# Patient Record
Sex: FEMALE | Race: WHITE | Employment: FULL TIME | URBAN - METROPOLITAN AREA
[De-identification: names, ages, dates, MRNs, and addresses within clinical notes are randomized per-mention and may not be internally consistent; named-entity substitution may affect disease eponyms.]

---

## 2016-12-23 LAB
ANTIBODY SCREEN OB: NEGATIVE
HEPATITIS B SURFACE ANTIGEN OB: NEGATIVE
HIV RESULT OB: NEGATIVE
RAPID PLASMA REAGIN OB: NONREACTIVE
RH FACTOR OB: POSITIVE

## 2017-06-09 LAB — AMB EXT STREP B CULTURE: NEGATIVE

## 2017-07-27 ENCOUNTER — HOSPITAL ENCOUNTER (INPATIENT)
Dept: OBGYN CLINIC | Facility: HOSPITAL | Age: 32
Discharge: HOME | End: 2017-07-27
Payer: COMMERCIAL

## 2017-07-27 ENCOUNTER — HOSPITAL ENCOUNTER (INPATIENT)
Facility: HOSPITAL | Age: 32
LOS: 2 days | Discharge: HOME | End: 2017-07-29
Attending: OBSTETRICS & GYNECOLOGY | Admitting: OBSTETRICS & GYNECOLOGY
Payer: COMMERCIAL

## 2017-07-27 PROBLEM — Z34.90 PREGNANCY (HCC): Status: ACTIVE | Noted: 2017-07-27

## 2017-07-27 PROBLEM — Z34.90 PREGNANCY: Status: ACTIVE | Noted: 2017-07-27

## 2017-07-27 LAB
ANTIBODY SCREEN: NEGATIVE
BILIRUBIN URINE: NEGATIVE
BLOOD URINE: NEGATIVE
CONTROL RUN WITHIN 24 HOURS?: YES
ERYTHROCYTE [DISTWIDTH] IN BLOOD BY AUTOMATED COUNT: 14.9 % (ref 11.5–16)
GLUCOSE URINE: NEGATIVE
HCT VFR BLD AUTO: 31.8 % (ref 34–50)
HGB BLD-MCNC: 10.7 G/DL (ref 12–16)
KETONE URINE: NEGATIVE
MCH RBC QN AUTO: 28.8 PG (ref 27–33.2)
MCHC RBC AUTO-ENTMCNC: 33.6 G/DL (ref 31–37)
MCV RBC AUTO: 85.7 FL (ref 81–100)
NITRITE URINE: NEGATIVE
PH URINE: 7 (ref 5–8)
PLATELET # BLD AUTO: 292 10(3)UL (ref 150–450)
PROTEIN URINE: NEGATIVE
RBC # BLD AUTO: 3.71 X10(6)UL (ref 3.8–5.1)
RED CELL DISTRIBUTION WIDTH-SD: 46.8 FL (ref 35.1–46.3)
RH BLOOD TYPE: POSITIVE
SPEC GRAVITY: 1.02 (ref 1–1.03)
T PALLIDUM AB SER QL IA: NONREACTIVE
URINE CLARITY: CLEAR
URINE COLOR: YELLOW
UROBILINOGEN URINE: 0.2
WBC # BLD AUTO: 8.1 X10(3) UL (ref 4–13)

## 2017-07-27 PROCEDURE — 86780 TREPONEMA PALLIDUM: CPT | Performed by: OBSTETRICS & GYNECOLOGY

## 2017-07-27 PROCEDURE — 10907ZC DRAINAGE OF AMNIOTIC FLUID, THERAPEUTIC FROM PRODUCTS OF CONCEPTION, VIA NATURAL OR ARTIFICIAL OPENING: ICD-10-PCS | Performed by: OBSTETRICS & GYNECOLOGY

## 2017-07-27 PROCEDURE — 86901 BLOOD TYPING SEROLOGIC RH(D): CPT | Performed by: OBSTETRICS & GYNECOLOGY

## 2017-07-27 PROCEDURE — 86850 RBC ANTIBODY SCREEN: CPT | Performed by: OBSTETRICS & GYNECOLOGY

## 2017-07-27 PROCEDURE — 81002 URINALYSIS NONAUTO W/O SCOPE: CPT

## 2017-07-27 PROCEDURE — 86900 BLOOD TYPING SEROLOGIC ABO: CPT | Performed by: OBSTETRICS & GYNECOLOGY

## 2017-07-27 PROCEDURE — 85027 COMPLETE CBC AUTOMATED: CPT | Performed by: OBSTETRICS & GYNECOLOGY

## 2017-07-27 RX ORDER — NALBUPHINE HCL 10 MG/ML
2.5 AMPUL (ML) INJECTION
Status: DISCONTINUED | OUTPATIENT
Start: 2017-07-27 | End: 2017-07-29

## 2017-07-27 RX ORDER — IBUPROFEN 600 MG/1
600 TABLET ORAL ONCE AS NEEDED
Status: DISCONTINUED | OUTPATIENT
Start: 2017-07-27 | End: 2017-07-28

## 2017-07-27 RX ORDER — DEXTROSE, SODIUM CHLORIDE, SODIUM LACTATE, POTASSIUM CHLORIDE, AND CALCIUM CHLORIDE 5; .6; .31; .03; .02 G/100ML; G/100ML; G/100ML; G/100ML; G/100ML
INJECTION, SOLUTION INTRAVENOUS AS NEEDED
Status: DISCONTINUED | OUTPATIENT
Start: 2017-07-27 | End: 2017-07-28

## 2017-07-27 RX ORDER — SODIUM CHLORIDE, SODIUM LACTATE, POTASSIUM CHLORIDE, CALCIUM CHLORIDE 600; 310; 30; 20 MG/100ML; MG/100ML; MG/100ML; MG/100ML
INJECTION, SOLUTION INTRAVENOUS CONTINUOUS
Status: DISCONTINUED | OUTPATIENT
Start: 2017-07-27 | End: 2017-07-28

## 2017-07-27 RX ORDER — TERBUTALINE SULFATE 1 MG/ML
0.25 INJECTION, SOLUTION SUBCUTANEOUS AS NEEDED
Status: DISCONTINUED | OUTPATIENT
Start: 2017-07-27 | End: 2017-07-28

## 2017-07-27 RX ORDER — CHOLECALCIFEROL (VITAMIN D3) 25 MCG
1 TABLET,CHEWABLE ORAL DAILY
COMMUNITY
End: 2018-02-26 | Stop reason: ALTCHOICE

## 2017-07-27 RX ORDER — EPHEDRINE SULFATE 50 MG/ML
5 INJECTION, SOLUTION INTRAVENOUS AS NEEDED
Status: DISCONTINUED | OUTPATIENT
Start: 2017-07-27 | End: 2017-07-28

## 2017-07-27 NOTE — PROGRESS NOTES
Pt is a 34year old female admitted to -A. Patient presents with:  Scheduled Induction: post dates     Pt is  40w5d intra-uterine pregnancy. History obtained, consents signed. Oriented to room, staff, and plan of care.

## 2017-07-27 NOTE — PROGRESS NOTES
Contracts q 3 -4 minutes all day long  Options discussed  VE: 2 - 3 / 60 / -1 posterior, medium - decided to continue so AROM - clear    (+) scalp stimulation with exam - tracing category 1    Continue post dates induction.

## 2017-07-27 NOTE — H&P
[facility]  History & Physical    Traci Up Patient Status:  Inpatient    1985 MRN [id number]   Location [address] [facility] Attending Soraida Dyson MD   Hosp Day # 0 PCP Nicole Blizzard, MD     Subjective:  Magalys Rice alternatives and possible complications have been discussed in detail with the patient. Pre-admission, admission, and post admission procedures and expectations were discussed in detail.   All questions answered, all appropriate consents will be signed at

## 2017-07-28 PROCEDURE — 0HQ9XZZ REPAIR PERINEUM SKIN, EXTERNAL APPROACH: ICD-10-PCS | Performed by: OBSTETRICS & GYNECOLOGY

## 2017-07-28 PROCEDURE — 3E033VJ INTRODUCTION OF OTHER HORMONE INTO PERIPHERAL VEIN, PERCUTANEOUS APPROACH: ICD-10-PCS | Performed by: OBSTETRICS & GYNECOLOGY

## 2017-07-28 RX ORDER — BISACODYL 10 MG
10 SUPPOSITORY, RECTAL RECTAL ONCE AS NEEDED
Status: ACTIVE | OUTPATIENT
Start: 2017-07-28 | End: 2017-07-28

## 2017-07-28 RX ORDER — HYDROCODONE BITARTRATE AND ACETAMINOPHEN 5; 325 MG/1; MG/1
1 TABLET ORAL EVERY 4 HOURS PRN
Status: DISCONTINUED | OUTPATIENT
Start: 2017-07-28 | End: 2017-07-29

## 2017-07-28 RX ORDER — ACETAMINOPHEN 325 MG/1
650 TABLET ORAL EVERY 4 HOURS PRN
Status: DISCONTINUED | OUTPATIENT
Start: 2017-07-28 | End: 2017-07-29

## 2017-07-28 RX ORDER — ZOLPIDEM TARTRATE 5 MG/1
5 TABLET ORAL NIGHTLY PRN
Status: DISCONTINUED | OUTPATIENT
Start: 2017-07-28 | End: 2017-07-29

## 2017-07-28 RX ORDER — HYDROCODONE BITARTRATE AND ACETAMINOPHEN 5; 325 MG/1; MG/1
2 TABLET ORAL EVERY 4 HOURS PRN
Status: DISCONTINUED | OUTPATIENT
Start: 2017-07-28 | End: 2017-07-29

## 2017-07-28 RX ORDER — FAMOTIDINE 20 MG/1
20 TABLET ORAL 2 TIMES DAILY
Status: DISCONTINUED | OUTPATIENT
Start: 2017-07-28 | End: 2017-07-29

## 2017-07-28 RX ORDER — METHYLERGONOVINE MALEATE 0.2 MG/1
0.2 TABLET ORAL 3 TIMES DAILY
Status: COMPLETED | OUTPATIENT
Start: 2017-07-28 | End: 2017-07-28

## 2017-07-28 RX ORDER — IBUPROFEN 600 MG/1
600 TABLET ORAL EVERY 6 HOURS
Status: DISCONTINUED | OUTPATIENT
Start: 2017-07-28 | End: 2017-07-29

## 2017-07-28 RX ORDER — DOCUSATE SODIUM 100 MG/1
100 CAPSULE, LIQUID FILLED ORAL
Status: DISCONTINUED | OUTPATIENT
Start: 2017-07-28 | End: 2017-07-29

## 2017-07-28 RX ORDER — SIMETHICONE 80 MG
80 TABLET,CHEWABLE ORAL 3 TIMES DAILY PRN
Status: DISCONTINUED | OUTPATIENT
Start: 2017-07-28 | End: 2017-07-29

## 2017-07-28 RX ORDER — SODIUM CHLORIDE, SODIUM LACTATE, POTASSIUM CHLORIDE, CALCIUM CHLORIDE 600; 310; 30; 20 MG/100ML; MG/100ML; MG/100ML; MG/100ML
INJECTION, SOLUTION INTRAVENOUS ONCE
Status: COMPLETED | OUTPATIENT
Start: 2017-07-28 | End: 2017-07-28

## 2017-07-28 NOTE — L&D DELIVERY NOTE
Ryan Gill, Girl  [[id number]]     Labor Events     labor?:  No    steroids?:  None   Rupture date:  17   Rupture time:  1715   Rupture type:  AROM   Fluid color:  Clear   Induction:  Oxytocin   Indications for induction:  Post-term Gestati Note          Christiane Singh Patient Status:  Inpatient    1985 MRN [id number]   Location [unfilled] Attending Mariah Rosa MD   Hosp Day # 1 PCP Eddie Taylor MD       Pre Op Dx:  IUP at 41 weeks         Post Dates Induction    Post Op

## 2017-07-29 VITALS
RESPIRATION RATE: 18 BRPM | SYSTOLIC BLOOD PRESSURE: 120 MMHG | HEIGHT: 67 IN | BODY MASS INDEX: 39.39 KG/M2 | TEMPERATURE: 98 F | DIASTOLIC BLOOD PRESSURE: 83 MMHG | HEART RATE: 75 BPM | WEIGHT: 251 LBS | OXYGEN SATURATION: 99 %

## 2017-07-29 PROBLEM — Z34.90 PREGNANCY (HCC): Status: RESOLVED | Noted: 2017-07-27 | Resolved: 2017-07-29

## 2017-07-29 PROBLEM — Z34.90 PREGNANCY: Status: RESOLVED | Noted: 2017-07-27 | Resolved: 2017-07-29

## 2017-07-29 LAB
BASOPHILS # BLD AUTO: 0.02 X10(3) UL (ref 0–0.1)
BASOPHILS NFR BLD AUTO: 0.2 %
EOSINOPHIL # BLD AUTO: 0.13 X10(3) UL (ref 0–0.3)
EOSINOPHIL NFR BLD AUTO: 1.4 %
ERYTHROCYTE [DISTWIDTH] IN BLOOD BY AUTOMATED COUNT: 15.6 % (ref 11.5–16)
HCT VFR BLD AUTO: 22.6 % (ref 34–50)
HGB BLD-MCNC: 7.2 G/DL (ref 12–16)
IMMATURE GRANULOCYTE COUNT: 0.06 X10(3) UL (ref 0–1)
IMMATURE GRANULOCYTE RATIO %: 0.7 %
LYMPHOCYTES # BLD AUTO: 1.89 X10(3) UL (ref 0.9–4)
LYMPHOCYTES NFR BLD AUTO: 20.8 %
MCH RBC QN AUTO: 28.7 PG (ref 27–33.2)
MCHC RBC AUTO-ENTMCNC: 31.9 G/DL (ref 31–37)
MCV RBC AUTO: 90 FL (ref 81–100)
MONOCYTES # BLD AUTO: 0.69 X10(3) UL (ref 0.1–0.6)
MONOCYTES NFR BLD AUTO: 7.6 %
NEUTROPHIL ABS PRELIM: 6.29 X10 (3) UL (ref 1.3–6.7)
NEUTROPHILS # BLD AUTO: 6.29 X10(3) UL (ref 1.3–6.7)
NEUTROPHILS NFR BLD AUTO: 69.3 %
PLATELET # BLD AUTO: 185 10(3)UL (ref 150–450)
RBC # BLD AUTO: 2.51 X10(6)UL (ref 3.8–5.1)
RED CELL DISTRIBUTION WIDTH-SD: 51.1 FL (ref 35.1–46.3)
WBC # BLD AUTO: 9.1 X10(3) UL (ref 4–13)

## 2017-07-29 PROCEDURE — 85025 COMPLETE CBC W/AUTO DIFF WBC: CPT | Performed by: OBSTETRICS & GYNECOLOGY

## 2017-07-29 NOTE — PROGRESS NOTES
[facility]  Post-Partum Progress Note    Li Prows Patient Status:  Inpatient    1985 MRN [id number]   [facility] 2SW-J Attending Pranay Hull MD   Hosp Day # 2 PCP Daniele Christiansen MD     SUBJECTIVE:    Postpartum

## 2017-07-29 NOTE — DISCHARGE SUMMARY
[facility]  Discharge Summary    Liana Mendez Patient Status:  Inpatient    1985 MRN [id number]   [facility] 2SW-J Attending Thomas Patel MD   Hosp Day # 2 PCP Amy Silveira MD         [facility]: Estimated Date of Deliver

## 2017-07-29 NOTE — PLAN OF CARE
POSTPARTUM    • Long Term Goal:Experiences normal postpartum course Completed    • Experiences normal breast weaning course Completed    • Appropriate maternal -  bonding Completed

## 2017-08-01 ENCOUNTER — TELEPHONE (OUTPATIENT)
Dept: OBGYN UNIT | Facility: HOSPITAL | Age: 32
End: 2017-08-01

## 2017-10-13 ENCOUNTER — OFFICE VISIT (OUTPATIENT)
Dept: FAMILY MEDICINE CLINIC | Facility: CLINIC | Age: 32
End: 2017-10-13

## 2017-10-13 VITALS
RESPIRATION RATE: 12 BRPM | TEMPERATURE: 99 F | DIASTOLIC BLOOD PRESSURE: 76 MMHG | SYSTOLIC BLOOD PRESSURE: 104 MMHG | HEART RATE: 64 BPM

## 2017-10-13 DIAGNOSIS — F41.1 ANXIETY STATE: ICD-10-CM

## 2017-10-13 DIAGNOSIS — F32.A DEPRESSION, UNSPECIFIED DEPRESSION TYPE: Primary | ICD-10-CM

## 2017-10-13 PROCEDURE — 99214 OFFICE O/P EST MOD 30 MIN: CPT | Performed by: FAMILY MEDICINE

## 2017-10-13 RX ORDER — FLUOXETINE HYDROCHLORIDE 20 MG/1
20 CAPSULE ORAL DAILY
Qty: 90 CAPSULE | Refills: 0 | Status: SHIPPED | OUTPATIENT
Start: 2017-10-13 | End: 2017-11-28

## 2017-10-13 RX ORDER — ALPRAZOLAM 0.25 MG/1
TABLET ORAL
Qty: 30 TABLET | Refills: 1 | Status: SHIPPED | OUTPATIENT
Start: 2017-10-13 | End: 2020-02-16

## 2017-10-13 NOTE — PROGRESS NOTES
Alfred Monahan is a 34year old female. CHIEF COMPLAINT   Depression and anxiety  HPI:   1 month old daughter. Not breastfeeding. Stopped prozac in April 2016. Started having problems with depression during pregnancy.   Somewhat worse after delivering depression/anxiety, discussed recommendation to stay on SSRI long term since has struggled with depression for many years.       ASSESSMENT AND PLAN:   Depression, unspecified depression type  (primary encounter diagnosis)  Anxiety state    No orders of the

## 2017-11-10 ENCOUNTER — HOSPITAL ENCOUNTER (OUTPATIENT)
Age: 32
Discharge: HOME | End: 2017-11-10
Attending: FAMILY MEDICINE
Payer: COMMERCIAL

## 2017-11-10 VITALS
BODY MASS INDEX: 33.74 KG/M2 | OXYGEN SATURATION: 98 % | HEART RATE: 61 BPM | WEIGHT: 215 LBS | TEMPERATURE: 98 F | HEIGHT: 67 IN | DIASTOLIC BLOOD PRESSURE: 77 MMHG | RESPIRATION RATE: 18 BRPM | SYSTOLIC BLOOD PRESSURE: 120 MMHG

## 2017-11-10 DIAGNOSIS — J02.9 TONSILLOPHARYNGITIS: Primary | ICD-10-CM

## 2017-11-10 DIAGNOSIS — H92.09 REFERRED OTALGIA, UNSPECIFIED LATERALITY: ICD-10-CM

## 2017-11-10 PROCEDURE — 87081 CULTURE SCREEN ONLY: CPT | Performed by: FAMILY MEDICINE

## 2017-11-10 PROCEDURE — 87430 STREP A AG IA: CPT | Performed by: FAMILY MEDICINE

## 2017-11-10 PROCEDURE — 99202 OFFICE O/P NEW SF 15 MIN: CPT

## 2017-11-10 PROCEDURE — 99214 OFFICE O/P EST MOD 30 MIN: CPT

## 2017-11-10 NOTE — ED INITIAL ASSESSMENT (HPI)
Pt presents to the immediate care due to sore throat and bilateral ear pain (L > R). Pt states she also has \"head cold\" symptoms including congestion. Pt denies known fevers.

## 2017-11-10 NOTE — ED PROVIDER NOTES
Patient Seen in: [address] [facility]    History   Patient presents with:  Sore Throat  Ear Pain    Stated Complaint: sore throat ear pain    HPI  This is a 34-year-old female coming in with complaints of sore throat and bilateral ear pain the bilateral tympanic membranes with middle ear effusion, nares normal, bilateral tympanic membranes with middle ear effusion, the left is worse than the right, no signs of ear infection, likely referred otalgia.   NECK: FROM, supple, anterior cervical lymphad gargling at least 3 times a day  · Adequate rest and hydration emphasized  · Vitamin C 8524-4667 mg per day  Worsening of symptoms including signs of complications and if so please go to the ER               Disposition and Plan     Clinical Impression:  T

## 2017-11-20 NOTE — TELEPHONE ENCOUNTER
From: Willem Jones  Sent: 11/20/2017 9:54 AM CST  Subject: Medication Renewal Request    Willem Jones would like a refill of the following medications:     FLUoxetine HCl 20 MG Oral Cap [Stephanie Dressler, PA-C]   Patient Comment: I've been taking 40mg

## 2017-11-21 NOTE — TELEPHONE ENCOUNTER
Med check scheduled for 12/21. Pt states she is out of med today and is leaving for out of town tonight. Pls send to [facility].

## 2017-11-22 RX ORDER — FLUOXETINE HYDROCHLORIDE 20 MG/1
20 CAPSULE ORAL DAILY
Qty: 90 CAPSULE | Refills: 0
Start: 2017-11-22

## 2017-11-28 RX ORDER — FLUOXETINE HYDROCHLORIDE 20 MG/1
20 CAPSULE ORAL DAILY
Qty: 90 CAPSULE | Refills: 0 | Status: SHIPPED | OUTPATIENT
Start: 2017-11-28 | End: 2017-12-28

## 2017-12-21 ENCOUNTER — TELEPHONE (OUTPATIENT)
Dept: FAMILY MEDICINE CLINIC | Facility: CLINIC | Age: 32
End: 2017-12-21

## 2017-12-21 NOTE — TELEPHONE ENCOUNTER
Patient called same day to cancel/reschedule. Did not realize son had a half day of school. Rescheduled to 12/28/17. No Show Policy reviewed. No charge letter sent via [address]

## 2017-12-28 NOTE — PROGRESS NOTES
Andrew Humphrey is a 34year old female. CHIEF COMPLAINT   Follow up on anxiety and depression  HPI:   Started taking 40mg of prozac after about 1 month of being on 20mg daily. Still not feeling as much edgard as she would like to.  Still feels like she can't FLUoxetine HCl 20 MG Oral Cap 270 capsule 1      Sig: Take 3 capsules (60 mg total) by mouth daily. Imaging & Consults:  OP REFERRAL TO [facility]    The patient indicates understanding of these issues and agrees to the plan.   The patient is asked

## 2018-02-26 PROBLEM — F50.819 BINGE EATING DISORDER: Status: ACTIVE | Noted: 2018-02-26

## 2018-02-26 PROBLEM — F50.81 BINGE EATING DISORDER: Status: ACTIVE | Noted: 2018-02-26

## 2018-02-27 ENCOUNTER — TELEPHONE (OUTPATIENT)
Dept: FAMILY MEDICINE CLINIC | Facility: CLINIC | Age: 33
End: 2018-02-27

## 2018-02-27 NOTE — TELEPHONE ENCOUNTER
Received request from [facility] for a PA to be completed for pt's Vyvanse 30 mg capsules every morning. Form completed, will fax with last office notes as soon as they are completed.   Form in the prior auth bin at T4 desk

## 2018-03-01 NOTE — PROGRESS NOTES
Binge eating disorder    Patient is here with a known diagnosis of binge eating disorder. She and her counselor have discussed the possibility of beginning Vyvanse therapy. We discussed the pros and cons of psychostimulant therapy.   Patient desires to go

## 2018-03-07 ENCOUNTER — TELEPHONE (OUTPATIENT)
Dept: FAMILY MEDICINE CLINIC | Facility: CLINIC | Age: 33
End: 2018-03-07

## 2018-03-07 NOTE — TELEPHONE ENCOUNTER
Outgoing call to Number provided by pharmacy, it is Jarett Lehman, member id provided by pharmacy given to [id number] specialist, member name and id provided, they are unable to locate patient in system.     Outgoing call to patient, information given, advised

## 2018-03-07 NOTE — TELEPHONE ENCOUNTER
Pt is calling our office concerned about the Refill Request for her Vyvance. Per pt, she took a script Dr. Velia Linton recently gave her to [place] and pt rec'd a call from our office that a PA is needed from [place], but pt is not familiar with [place].   Her I

## 2018-03-19 NOTE — TELEPHONE ENCOUNTER
Incoming fax received from [employer]. Letter of determination. Medication approved coverage 2/17/2018 through 3/19/2019. Patient notified, understanding verbalized. Case ID: [id number].

## 2018-04-19 NOTE — TELEPHONE ENCOUNTER
Patient is looking for her Lisdexamfetamine Dimesylate (VYVANSE) 40 MG Oral Cap from 4/5/18. Not in drawer at . Printed and given to [employer].

## 2018-06-14 NOTE — TELEPHONE ENCOUNTER
From: Lisy Jiménez  Sent: 6/11/2018 9:06 PM CDT  Subject: Medication Renewal Request    Lisy Jiménez would like a refill of the following medications:     Lisdexamfetamine Dimesylate (VYVANSE) 40 MG Oral Cap Johnny Calderon MD]   Patient Comment: I

## 2018-06-28 NOTE — PROGRESS NOTES
Binge eating disorder    Patient is here with a known diagnosis of binge eating disorder. She she has been seeing her counselor on a regular basis. She has been on Vyvanse 40 mg daily. She does believe it is helping.   She does believe she may be becom

## 2018-10-01 NOTE — PROGRESS NOTES
Binge eating disorder    Patient is here with a known diagnosis of binge eating disorder. She she has been seeing her counselor on a regular basis. She has been on an increased dose of Vyvanse to 50 mg. She does believe it is helping.   She has been ashley

## 2018-10-16 RX ORDER — FLUOXETINE HYDROCHLORIDE 20 MG/1
CAPSULE ORAL
Qty: 270 CAPSULE | Refills: 0 | Status: SHIPPED | OUTPATIENT
Start: 2018-10-16 | End: 2019-04-03

## 2018-10-23 ENCOUNTER — OFFICE VISIT (OUTPATIENT)
Dept: FAMILY MEDICINE CLINIC | Facility: CLINIC | Age: 33
End: 2018-10-23
Payer: COMMERCIAL

## 2018-10-23 VITALS
HEART RATE: 68 BPM | SYSTOLIC BLOOD PRESSURE: 128 MMHG | TEMPERATURE: 99 F | DIASTOLIC BLOOD PRESSURE: 74 MMHG | HEIGHT: 67.5 IN | RESPIRATION RATE: 12 BRPM | WEIGHT: 207 LBS | BODY MASS INDEX: 32.11 KG/M2

## 2018-10-23 DIAGNOSIS — Z13.89 SCREENING FOR GENITOURINARY CONDITION: ICD-10-CM

## 2018-10-23 DIAGNOSIS — Z23 NEED FOR VACCINATION: ICD-10-CM

## 2018-10-23 DIAGNOSIS — Z00.00 BLOOD TESTS FOR ROUTINE GENERAL PHYSICAL EXAMINATION: ICD-10-CM

## 2018-10-23 DIAGNOSIS — Z12.4 PAP SMEAR FOR CERVICAL CANCER SCREENING: ICD-10-CM

## 2018-10-23 DIAGNOSIS — Z00.00 ROUTINE GENERAL MEDICAL EXAMINATION AT A HEALTH CARE FACILITY: Primary | ICD-10-CM

## 2018-10-23 PROBLEM — K59.00 CONSTIPATION: Status: ACTIVE | Noted: 2018-10-23

## 2018-10-23 PROBLEM — K62.5 HEMORRHAGE OF RECTUM AND ANUS: Status: ACTIVE | Noted: 2018-10-23

## 2018-10-23 PROCEDURE — 90471 IMMUNIZATION ADMIN: CPT | Performed by: FAMILY MEDICINE

## 2018-10-23 PROCEDURE — 88175 CYTOPATH C/V AUTO FLUID REDO: CPT | Performed by: FAMILY MEDICINE

## 2018-10-23 PROCEDURE — 90686 IIV4 VACC NO PRSV 0.5 ML IM: CPT | Performed by: FAMILY MEDICINE

## 2018-10-23 PROCEDURE — 87624 HPV HI-RISK TYP POOLED RSLT: CPT | Performed by: FAMILY MEDICINE

## 2018-10-23 PROCEDURE — 99395 PREV VISIT EST AGE 18-39: CPT | Performed by: FAMILY MEDICINE

## 2018-10-23 RX ORDER — SUMATRIPTAN 100 MG/1
TABLET, FILM COATED ORAL
Qty: 9 TABLET | Refills: 5 | Status: SHIPPED | OUTPATIENT
Start: 2018-10-23 | End: 2020-01-23

## 2018-10-23 NOTE — PROGRESS NOTES
CHIEF COMPLAINT   Well woman exam  HPI:   Alfred Draper is a 28year old female who presents for a complete physical exam.    History of HPV age 20. Colpo with biopsy - no other treatment needed per patient. Migraines several years ago.   Was treated wit complaint of back pain  NEURO: migraines mid cycle and with menses.     PSYCHE: stable  HEMATOLOGIC: denies hx of anemia    EXAM:   /74   Pulse 68   Temp 98.6 °F (37 °C) (Oral)   Resp 12   Ht 67.5\"   Wt 207 lb   LMP 10/14/2018   BMI 31.94 kg/m²   Bod

## 2019-01-16 NOTE — TELEPHONE ENCOUNTER
Pt calling requesting refill of Lisdexamfetamine Dimesylate (VYVANSE) 50 MG Oral Cap. Pt will . Please advise.

## 2019-02-20 NOTE — TELEPHONE ENCOUNTER
Last med visit 10/1/19-advised follow up in 6 months. Has appt sched for 4/1/19  Med last ordered as 1/16/19  Please see med orders pended for review-thanks!

## 2019-04-03 RX ORDER — FLUOXETINE HYDROCHLORIDE 20 MG/1
CAPSULE ORAL
Qty: 270 CAPSULE | Refills: 0 | Status: CANCELLED | OUTPATIENT
Start: 2019-04-03

## 2019-04-03 RX ORDER — FLUOXETINE HYDROCHLORIDE 20 MG/1
40 CAPSULE ORAL DAILY
Qty: 60 CAPSULE | Refills: 0 | Status: SHIPPED | OUTPATIENT
Start: 2019-04-03 | End: 2019-05-03

## 2019-04-03 NOTE — TELEPHONE ENCOUNTER
record shows last med visit w dr Nohelia Charles 10/1/2018-wpt reporting taking fluoxetine (2) 20 mg capsules daily and doing well- recommended follow up in 6 months. No med visits since.   Fluoxetine last ordered 10/16/18 #270, no RF  Call to pt's cell reaches

## 2019-04-03 NOTE — TELEPHONE ENCOUNTER
Pt is going out of town and requesting short term refill of FLUOXETINE HCL 20 MG Oral Cap sent to her local [facility] in chart. Please advise.

## 2019-05-02 RX ORDER — FLUOXETINE HYDROCHLORIDE 20 MG/1
CAPSULE ORAL
Qty: 60 CAPSULE | Refills: 0 | OUTPATIENT
Start: 2019-05-02

## 2019-05-03 NOTE — PROGRESS NOTES
Binge eating disorder    Patient is here with a known diagnosis of binge eating disorder. She she has been seeing her counselor on a regular basis. She has been on Vyvanse 50 mg daily. She does believe it is helping.    She does feel it wears off in th

## 2019-06-07 DIAGNOSIS — F32.A DEPRESSION, UNSPECIFIED DEPRESSION TYPE: ICD-10-CM

## 2019-06-07 RX ORDER — FLUOXETINE HYDROCHLORIDE 20 MG/1
CAPSULE ORAL
Qty: 60 CAPSULE | Refills: 2 | Status: SHIPPED | OUTPATIENT
Start: 2019-06-07 | End: 2019-06-17

## 2019-06-13 ENCOUNTER — TELEPHONE (OUTPATIENT)
Dept: FAMILY MEDICINE CLINIC | Facility: CLINIC | Age: 34
End: 2019-06-13

## 2019-06-13 RX ORDER — DEXTROAMPHETAMINE SACCHARATE, AMPHETAMINE ASPARTATE, DEXTROAMPHETAMINE SULFATE AND AMPHETAMINE SULFATE 1.25; 1.25; 1.25; 1.25 MG/1; MG/1; MG/1; MG/1
5 TABLET ORAL DAILY
Qty: 30 TABLET | Refills: 0 | Status: SHIPPED | OUTPATIENT
Start: 2019-06-13 | End: 2019-07-13

## 2019-06-13 NOTE — TELEPHONE ENCOUNTER
See note below, pt had ov 5/3/19 and is currently taking Vyvanse in am, would like to try Adderall in the evening but was not told of the dose. PLease advise.

## 2019-06-13 NOTE — TELEPHONE ENCOUNTER
Pt states at her OV last month she and Dr LOPEZ discussed changing timing of  Lisdexamfetamine Dimesylate (VYVANSE) 50 MG Oral Cap. She tried and it works best in the AM like she has been taking it.  She would like to try adding the Adderall in the evening as d

## 2019-06-13 NOTE — TELEPHONE ENCOUNTER
Can try plain Adderall 5 mg 1 tablet in the early afternoon. #30 and no refill.   Ever update on her progress in 2 weeks

## 2019-06-17 DIAGNOSIS — F32.A DEPRESSION, UNSPECIFIED DEPRESSION TYPE: ICD-10-CM

## 2019-06-17 RX ORDER — FLUOXETINE HYDROCHLORIDE 20 MG/1
CAPSULE ORAL
Qty: 180 CAPSULE | Refills: 1 | Status: SHIPPED | OUTPATIENT
Start: 2019-06-17 | End: 2020-01-02

## 2019-06-28 ENCOUNTER — OFFICE VISIT (OUTPATIENT)
Dept: FAMILY MEDICINE CLINIC | Facility: CLINIC | Age: 34
End: 2019-06-28
Payer: COMMERCIAL

## 2019-06-28 VITALS
WEIGHT: 220 LBS | BODY MASS INDEX: 34 KG/M2 | OXYGEN SATURATION: 98 % | HEART RATE: 87 BPM | TEMPERATURE: 98 F | DIASTOLIC BLOOD PRESSURE: 84 MMHG | RESPIRATION RATE: 18 BRPM | SYSTOLIC BLOOD PRESSURE: 116 MMHG

## 2019-06-28 DIAGNOSIS — J22 LOWER RESPIRATORY INFECTION (E.G., BRONCHITIS, PNEUMONIA, PNEUMONITIS, PULMONITIS): Primary | ICD-10-CM

## 2019-06-28 PROCEDURE — 99213 OFFICE O/P EST LOW 20 MIN: CPT | Performed by: NURSE PRACTITIONER

## 2019-06-28 RX ORDER — DOXYCYCLINE HYCLATE 100 MG
100 TABLET ORAL 2 TIMES DAILY
Qty: 14 TABLET | Refills: 0 | Status: SHIPPED | OUTPATIENT
Start: 2019-06-28 | End: 2019-07-05

## 2019-06-28 NOTE — PROGRESS NOTES
CHIEF COMPLAINT:   Patient presents with:  Cough: ear pain/sore throat/congestion x 2 weeks. no fever      HPI:   Rachael Meek is a 27year old female who presents for cough for  2  weeks. Cough is productive and is throughout the say.  Cough isn't interfe REVIEW OF SYSTEMS:   GENERAL: See HPI  SKIN: No rashes, or other skin lesions. EYES: Denies blurred vision or double vision. HENT: Denies ear pain or decreased hearing. See HPI  CARDIOVASCULAR: Denies chest pain or palpitations  LUNGS: Per HPI. • Doxycycline Hyclate 100 MG Oral Tab 14 tablet 0     Sig: Take 1 tablet (100 mg total) by mouth 2 (two) times daily for 7 days. Patient Instructions   Start plain Mucinex (guaifenesin). Take antibiotics as prescribed.        Bronchitis, Antibioti · Over-the-counter cough, cold, and sore-throat medicines will not shorten the length of the illness, but they may be helpful to reduce your symptoms. Don't use decongestants if you have high blood pressure. · Finish all antibiotic medicine.  Do this even

## 2019-06-28 NOTE — PATIENT INSTRUCTIONS
Start plain Mucinex (guaifenesin). Take antibiotics as prescribed. Bronchitis, Antibiotic Treatment (Adult)    Bronchitis is an infection of the air passages (bronchial tubes) in your lungs. It often occurs when you have a cold.  This illness is con if you are feeling better after only a few days. Follow-up care  Follow up with your healthcare provider, or as advised. If you had an X-ray or ECG (electrocardiogram), a specialist will review it.  You will be told of any new test results that may affect

## 2019-09-25 ENCOUNTER — APPOINTMENT (OUTPATIENT)
Dept: OTHER | Age: 34
End: 2019-09-25
Attending: FAMILY MEDICINE

## 2020-01-01 DIAGNOSIS — F32.A DEPRESSION, UNSPECIFIED DEPRESSION TYPE: ICD-10-CM

## 2020-01-02 RX ORDER — FLUOXETINE HYDROCHLORIDE 20 MG/1
CAPSULE ORAL
Qty: 180 CAPSULE | Refills: 1 | Status: SHIPPED | OUTPATIENT
Start: 2020-01-02 | End: 2020-06-22

## 2020-01-02 NOTE — TELEPHONE ENCOUNTER
FLUOXETINE HCL CAPS 20MG    Non protocol medication. Please see pended medications. Please sign if appropriate. Thank you     Her last OV was on 05/03/2019.

## 2020-01-23 ENCOUNTER — TELEPHONE (OUTPATIENT)
Dept: FAMILY MEDICINE CLINIC | Facility: CLINIC | Age: 35
End: 2020-01-23

## 2020-02-03 ENCOUNTER — PATIENT MESSAGE (OUTPATIENT)
Dept: FAMILY MEDICINE CLINIC | Facility: CLINIC | Age: 35
End: 2020-02-03

## 2020-02-03 ENCOUNTER — OFFICE VISIT (OUTPATIENT)
Dept: FAMILY MEDICINE CLINIC | Facility: CLINIC | Age: 35
End: 2020-02-03
Payer: COMMERCIAL

## 2020-02-03 ENCOUNTER — LAB ENCOUNTER (OUTPATIENT)
Dept: LAB | Age: 35
End: 2020-02-03
Attending: FAMILY MEDICINE
Payer: COMMERCIAL

## 2020-02-03 VITALS
HEART RATE: 64 BPM | SYSTOLIC BLOOD PRESSURE: 116 MMHG | TEMPERATURE: 98 F | HEIGHT: 67.5 IN | WEIGHT: 235 LBS | BODY MASS INDEX: 36.45 KG/M2 | DIASTOLIC BLOOD PRESSURE: 80 MMHG | RESPIRATION RATE: 12 BRPM

## 2020-02-03 DIAGNOSIS — Z00.00 BLOOD TESTS FOR ROUTINE GENERAL PHYSICAL EXAMINATION: ICD-10-CM

## 2020-02-03 DIAGNOSIS — Z00.00 ROUTINE GENERAL MEDICAL EXAMINATION AT A HEALTH CARE FACILITY: Primary | ICD-10-CM

## 2020-02-03 DIAGNOSIS — R73.09 ELEVATED GLUCOSE: Primary | ICD-10-CM

## 2020-02-03 DIAGNOSIS — Z11.51 SCREENING FOR HPV (HUMAN PAPILLOMAVIRUS): ICD-10-CM

## 2020-02-03 DIAGNOSIS — Z12.4 PAP SMEAR FOR CERVICAL CANCER SCREENING: ICD-10-CM

## 2020-02-03 DIAGNOSIS — Z13.89 SCREENING FOR GENITOURINARY CONDITION: ICD-10-CM

## 2020-02-03 DIAGNOSIS — Z80.0 FAMILY HISTORY OF COLON CANCER: ICD-10-CM

## 2020-02-03 DIAGNOSIS — R73.09 ELEVATED GLUCOSE: ICD-10-CM

## 2020-02-03 LAB
ALBUMIN SERPL-MCNC: 3.7 G/DL (ref 3.4–5)
ALBUMIN/GLOB SERPL: 1 {RATIO} (ref 1–2)
ALP LIVER SERPL-CCNC: 55 U/L (ref 37–98)
ALT SERPL-CCNC: 32 U/L (ref 13–56)
ANION GAP SERPL CALC-SCNC: 3 MMOL/L (ref 0–18)
AST SERPL-CCNC: 22 U/L (ref 15–37)
BASOPHILS # BLD AUTO: 0.05 X10(3) UL (ref 0–0.2)
BASOPHILS NFR BLD AUTO: 1.2 %
BILIRUB SERPL-MCNC: 0.2 MG/DL (ref 0.1–2)
BILIRUB UR QL STRIP.AUTO: NEGATIVE
BUN BLD-MCNC: 15 MG/DL (ref 7–18)
BUN/CREAT SERPL: 17.6 (ref 10–20)
CALCIUM BLD-MCNC: 8.9 MG/DL (ref 8.5–10.1)
CHLORIDE SERPL-SCNC: 105 MMOL/L (ref 98–112)
CHOLEST SMN-MCNC: 236 MG/DL (ref ?–200)
CLARITY UR REFRACT.AUTO: CLEAR
CO2 SERPL-SCNC: 27 MMOL/L (ref 21–32)
COLOR UR AUTO: YELLOW
CREAT BLD-MCNC: 0.85 MG/DL (ref 0.55–1.02)
DEPRECATED RDW RBC AUTO: 45.1 FL (ref 35.1–46.3)
EOSINOPHIL # BLD AUTO: 0.13 X10(3) UL (ref 0–0.7)
EOSINOPHIL NFR BLD AUTO: 3.1 %
ERYTHROCYTE [DISTWIDTH] IN BLOOD BY AUTOMATED COUNT: 13.2 % (ref 11–15)
EST. AVERAGE GLUCOSE BLD GHB EST-MCNC: 105 MG/DL (ref 68–126)
GLOBULIN PLAS-MCNC: 3.6 G/DL (ref 2.8–4.4)
GLUCOSE BLD-MCNC: 100 MG/DL (ref 70–99)
GLUCOSE UR STRIP.AUTO-MCNC: NEGATIVE MG/DL
HBA1C MFR BLD HPLC: 5.3 % (ref ?–5.7)
HCT VFR BLD AUTO: 40.5 % (ref 35–48)
HDLC SERPL-MCNC: 43 MG/DL (ref 40–59)
HGB BLD-MCNC: 13.1 G/DL (ref 12–16)
IMM GRANULOCYTES # BLD AUTO: 0.01 X10(3) UL (ref 0–1)
IMM GRANULOCYTES NFR BLD: 0.2 %
KETONES UR STRIP.AUTO-MCNC: NEGATIVE MG/DL
LDLC SERPL CALC-MCNC: 158 MG/DL (ref ?–100)
LEUKOCYTE ESTERASE UR QL STRIP.AUTO: NEGATIVE
LYMPHOCYTES # BLD AUTO: 1.7 X10(3) UL (ref 1–4)
LYMPHOCYTES NFR BLD AUTO: 41.1 %
M PROTEIN MFR SERPL ELPH: 7.3 G/DL (ref 6.4–8.2)
MCH RBC QN AUTO: 30 PG (ref 26–34)
MCHC RBC AUTO-ENTMCNC: 32.3 G/DL (ref 31–37)
MCV RBC AUTO: 92.7 FL (ref 80–100)
MONOCYTES # BLD AUTO: 0.43 X10(3) UL (ref 0.1–1)
MONOCYTES NFR BLD AUTO: 10.4 %
NEUTROPHILS # BLD AUTO: 1.82 X10 (3) UL (ref 1.5–7.7)
NEUTROPHILS # BLD AUTO: 1.82 X10(3) UL (ref 1.5–7.7)
NEUTROPHILS NFR BLD AUTO: 44 %
NITRITE UR QL STRIP.AUTO: NEGATIVE
NONHDLC SERPL-MCNC: 193 MG/DL (ref ?–130)
OSMOLALITY SERPL CALC.SUM OF ELEC: 281 MOSM/KG (ref 275–295)
PATIENT FASTING Y/N/NP: YES
PATIENT FASTING Y/N/NP: YES
PH UR STRIP.AUTO: 6 [PH] (ref 4.5–8)
PLATELET # BLD AUTO: 312 10(3)UL (ref 150–450)
POTASSIUM SERPL-SCNC: 4.6 MMOL/L (ref 3.5–5.1)
PROT UR STRIP.AUTO-MCNC: NEGATIVE MG/DL
RBC # BLD AUTO: 4.37 X10(6)UL (ref 3.8–5.3)
RBC UR QL AUTO: NEGATIVE
SODIUM SERPL-SCNC: 135 MMOL/L (ref 136–145)
SP GR UR STRIP.AUTO: 1.03 (ref 1–1.03)
TRIGL SERPL-MCNC: 174 MG/DL (ref 30–149)
TSI SER-ACNC: 1.85 MIU/ML (ref 0.36–3.74)
UROBILINOGEN UR STRIP.AUTO-MCNC: <2 MG/DL
VLDLC SERPL CALC-MCNC: 35 MG/DL (ref 0–30)
WBC # BLD AUTO: 4.1 X10(3) UL (ref 4–11)

## 2020-02-03 PROCEDURE — 88175 CYTOPATH C/V AUTO FLUID REDO: CPT | Performed by: FAMILY MEDICINE

## 2020-02-03 PROCEDURE — 81003 URINALYSIS AUTO W/O SCOPE: CPT

## 2020-02-03 PROCEDURE — 99395 PREV VISIT EST AGE 18-39: CPT | Performed by: FAMILY MEDICINE

## 2020-02-03 PROCEDURE — 80061 LIPID PANEL: CPT

## 2020-02-03 PROCEDURE — 80053 COMPREHEN METABOLIC PANEL: CPT

## 2020-02-03 PROCEDURE — 83036 HEMOGLOBIN GLYCOSYLATED A1C: CPT

## 2020-02-03 PROCEDURE — 87624 HPV HI-RISK TYP POOLED RSLT: CPT | Performed by: FAMILY MEDICINE

## 2020-02-03 PROCEDURE — 84443 ASSAY THYROID STIM HORMONE: CPT

## 2020-02-03 PROCEDURE — 85025 COMPLETE CBC W/AUTO DIFF WBC: CPT

## 2020-02-03 PROCEDURE — 36415 COLL VENOUS BLD VENIPUNCTURE: CPT

## 2020-02-03 NOTE — PROGRESS NOTES
CHIEF COMPLAINT   Well woman exam  HPI:   Justin Waller is a 27year old female who presents for a complete physical exam.    Paps normal for past 10 years. Prior to that, had a positive HPV.   Had colpo with biopsy but patient states no treatment after th vaginal discharge  MUSCULOSKELETAL: no complaint of back pain  NEURO: no complaint of headaches  PSYCHE: seeing counselor and Dr. Cleopatra Martin.    HEMATOLOGIC: denies hx of anemia    EXAM:   /80   Pulse 64   Temp 97.7 °F (36.5 °C) (Oral)   Resp 12   Ht 6

## 2020-02-04 DIAGNOSIS — E78.00 ELEVATED LDL CHOLESTEROL LEVEL: Primary | ICD-10-CM

## 2020-02-04 NOTE — TELEPHONE ENCOUNTER
From: Deysi Bermudez  To: Will Chang PA-C  Sent: 2/3/2020 2:28 PM CST  Subject: Test Results Question    Hi there - should I be concerned that my WBC is low (4.1)? I'm not sick right now or anything. Is there anything I can do to improve it?

## 2020-02-06 ENCOUNTER — TELEPHONE (OUTPATIENT)
Dept: FAMILY MEDICINE CLINIC | Facility: CLINIC | Age: 35
End: 2020-02-06

## 2020-02-06 NOTE — TELEPHONE ENCOUNTER
Rosie Tom,     We have to check navinet for those referrals.  Depends on whether they are on aetnas list. [facility] patient call their plan to double check.  But we do get a hard stop when that happens.     Previou

## 2020-02-07 LAB — HPV I/H RISK 1 DNA SPEC QL NAA+PROBE: NEGATIVE

## 2020-02-16 DIAGNOSIS — F50.81 BINGE EATING DISORDER: ICD-10-CM

## 2020-02-16 RX ORDER — ALPRAZOLAM 0.25 MG/1
TABLET ORAL
Qty: 30 TABLET | Refills: 1 | Status: SHIPPED | OUTPATIENT
Start: 2020-02-16 | End: 2021-07-14

## 2020-04-15 DIAGNOSIS — G43.109 MIGRAINE WITH AURA AND WITHOUT STATUS MIGRAINOSUS, NOT INTRACTABLE: ICD-10-CM

## 2020-04-15 RX ORDER — SUMATRIPTAN 100 MG/1
TABLET, FILM COATED ORAL
Qty: 9 TABLET | Refills: 5 | Status: SHIPPED | OUTPATIENT
Start: 2020-04-15

## 2020-04-15 NOTE — TELEPHONE ENCOUNTER
Received request from [employer] for refill of sumatriptan tabs, this medication was filled on Jan 23 to [facility] for #9 + 5, called to pt to check if she had used all of the medication already.   Pt stated that she would like the medication to now g

## 2020-05-06 ENCOUNTER — TELEPHONE (OUTPATIENT)
Dept: FAMILY MEDICINE CLINIC | Facility: CLINIC | Age: 35
End: 2020-05-06

## 2020-05-06 DIAGNOSIS — F50.81 BINGE EATING DISORDER: ICD-10-CM

## 2020-05-06 NOTE — TELEPHONE ENCOUNTER
Pt requested refill of   Lisdexamfetamine Dimesylate (VYVANSE) 50 MG Oral Cap (Discontinued) 30 capsule 0     To be sent to:  [address], [address] [facility] [address], [phone], [phone]. Thank you.

## 2020-05-18 ENCOUNTER — VIRTUAL PHONE E/M (OUTPATIENT)
Dept: FAMILY MEDICINE CLINIC | Facility: CLINIC | Age: 35
End: 2020-05-18
Payer: COMMERCIAL

## 2020-05-18 DIAGNOSIS — G47.00 INSOMNIA, UNSPECIFIED TYPE: Primary | ICD-10-CM

## 2020-05-18 PROCEDURE — 99443 PHONE E/M BY PHYS 21-30 MIN: CPT | Performed by: FAMILY MEDICINE

## 2020-05-18 RX ORDER — ZALEPLON 5 MG/1
CAPSULE ORAL
Qty: 30 CAPSULE | Refills: 3 | Status: SHIPPED | OUTPATIENT
Start: 2020-05-18 | End: 2020-11-05

## 2020-05-18 NOTE — PROGRESS NOTES
Virtual Telephone Check-In    Alfred Monahan verbally consents to a Virtual/Telephone Check-In visit on 05/18/20. Patient understands and accepts financial responsibility for any deductible, co-insurance and/or co-pays associated with this service.     Davi

## 2020-06-09 DIAGNOSIS — F50.81 BINGE EATING DISORDER: ICD-10-CM

## 2020-06-09 NOTE — TELEPHONE ENCOUNTER
Pt is requesting a 90 day refill on     Lisdexamfetamine Dimesylate (VYVANSE) 50 MG Oral Cap (Discontinued) 30 capsule 0 3/25/2020 2/3/2020   Sig:   Take 1 capsule (50 mg total) by mouth daily.      Route:   Oral     Reason for Discontinue:   Duplicate th

## 2020-06-21 DIAGNOSIS — F32.A DEPRESSION, UNSPECIFIED DEPRESSION TYPE: ICD-10-CM

## 2020-06-22 ENCOUNTER — PATIENT MESSAGE (OUTPATIENT)
Dept: FAMILY MEDICINE CLINIC | Facility: CLINIC | Age: 35
End: 2020-06-22

## 2020-06-22 RX ORDER — FLUOXETINE HYDROCHLORIDE 20 MG/1
CAPSULE ORAL
Qty: 180 CAPSULE | Refills: 3 | Status: SHIPPED | OUTPATIENT
Start: 2020-06-22 | End: 2020-11-23

## 2020-06-22 NOTE — TELEPHONE ENCOUNTER
Pt was approved to increase dosage of her Zaleplon from 5mg to 10mg nightly by Roberto Van on 5/21/20.   New Rx was never sent, please see pended medication for approval.

## 2020-06-22 NOTE — TELEPHONE ENCOUNTER
From: Lisy Jiménez  To: Jerman Bautista PA-C  Sent: 5/18/2020 10:58 AM CDT  Subject: Prescription Question    Hi - I am experiencing severe anxiety-related insomnia.  This is nothing new, but I have episodes every few months that span a long period of

## 2020-06-22 NOTE — TELEPHONE ENCOUNTER
From: Javon Ragsdale  To: Twila Steinberg PA-C  Sent: 6/22/2020 1:26 PM CDT  Subject: Test Results Question    Hello! I had blood work on 2/3/20 and had some elevated levels. I've lost about 15 pounds since then.  When should I come back for another [facility]

## 2020-07-07 DIAGNOSIS — F50.81 BINGE EATING DISORDER: ICD-10-CM

## 2020-08-17 ENCOUNTER — PATIENT MESSAGE (OUTPATIENT)
Dept: FAMILY MEDICINE CLINIC | Facility: CLINIC | Age: 35
End: 2020-08-17

## 2020-08-17 DIAGNOSIS — F50.81 BINGE EATING DISORDER: ICD-10-CM

## 2020-08-17 NOTE — TELEPHONE ENCOUNTER
Pt requests refill via [employer]. Last fill 7.6  Has appt next month. Please approve if appropriate. Thanks.

## 2020-09-17 ENCOUNTER — LAB ENCOUNTER (OUTPATIENT)
Dept: LAB | Age: 35
End: 2020-09-17
Attending: FAMILY MEDICINE
Payer: COMMERCIAL

## 2020-09-17 DIAGNOSIS — F50.81 BINGE EATING DISORDER: ICD-10-CM

## 2020-09-17 DIAGNOSIS — Z00.00 ROUTINE PHYSICAL EXAMINATION: Primary | ICD-10-CM

## 2020-09-17 LAB
CHOLEST SMN-MCNC: 277 MG/DL (ref ?–200)
HDLC SERPL-MCNC: 59 MG/DL (ref 40–59)
LDLC SERPL CALC-MCNC: 185 MG/DL (ref ?–100)
NONHDLC SERPL-MCNC: 218 MG/DL (ref ?–130)
PATIENT FASTING Y/N/NP: YES
TRIGL SERPL-MCNC: 163 MG/DL (ref 30–149)
VLDLC SERPL CALC-MCNC: 33 MG/DL (ref 0–30)

## 2020-09-17 PROCEDURE — 80061 LIPID PANEL: CPT

## 2020-09-17 NOTE — TELEPHONE ENCOUNTER
Pt rescheduled her appt for a follow up on her vyvanse for 10/13/2020. I gave her the information for COVID-19 testing done through [facility]. Pt verbalized  understanding. Please authorize refill.

## 2020-09-17 NOTE — TELEPHONE ENCOUNTER
Pt requesting refill of Lisdexamfetamine Dimesylate (VYVANSE) 50 MG Oral Cap  To be sent to [facility] in chart. Pt states that she tried to do the video visit on Monday but she couldn't connect.     Pt also states that her son was exposed to covid and wa

## 2020-09-18 NOTE — TELEPHONE ENCOUNTER
From: Nikia Iraheta  To: Rosangela Natarajan MD  Sent: 9/17/2020 3:23 PM CDT  Subject: Test Results Question    I have a question about LIPID PANEL resulted on 9/17/20, 11:06 AM.    Are there results from the metabolic panel?     I've lost 25lbs since the fi

## 2020-10-05 ENCOUNTER — PATIENT MESSAGE (OUTPATIENT)
Dept: FAMILY MEDICINE CLINIC | Facility: CLINIC | Age: 35
End: 2020-10-05

## 2020-10-07 NOTE — TELEPHONE ENCOUNTER
From: Dustin Null  To: Keara Downs MD  Sent: 10/5/2020 10:29 AM CDT  Subject: Test Results Question    Hi Dr. Radha Garcia - I had blood work on 2/3/20. I was to repeat the blood work in 6 months and went in on 9/17/20.  The only test done was for the

## 2020-10-07 NOTE — TELEPHONE ENCOUNTER
LM for pt to cb. It looks like cmp was supposed to be added last month per Huma's mychart msg to her, but never was placed that way? She will need to re-draw her cmp-order is in.  As for other labs-her thyroid was normal in feb so we would need to just

## 2020-10-15 ENCOUNTER — TELEPHONE (OUTPATIENT)
Dept: FAMILY MEDICINE CLINIC | Facility: CLINIC | Age: 35
End: 2020-10-15

## 2020-10-15 NOTE — TELEPHONE ENCOUNTER
Pt called stating she needs labs and EKG ordered for Assesment for eating disorder program at [facility]. Pt states she was told over the phone that she needs CMP,CBC, TSH, T-2 TOTAL, T-3,T-4, MAGNESIUM, PHOSPHEROUS, AMYLASE, EKG.   Please advise if you wa

## 2020-10-15 NOTE — TELEPHONE ENCOUNTER
Please set up a video visit with me or Loly or Dr. Ruchi Whitman (or in person visit if she would prefer). Ok to use an SDA with me on Monday.

## 2020-10-19 NOTE — PROGRESS NOTES
Kane Mendez is a 38year old female. CHIEF COMPLAINT   Needs labs for eating disorder program  HPI:   Started doing counseling a couple years ago and then started again after daughter was born 3 years ago.   Referred to binge eating disorder program dominic estes mood  25 minute visit of which greater than 50% of visit was spent in coordination of care and counseling on discussing eating disorder, discussing mental health      ASSESSMENT AND PLAN:     Binge eating disorder  (primary encounter diagnosis)  Hyperchole

## 2020-10-20 ENCOUNTER — LAB ENCOUNTER (OUTPATIENT)
Dept: LAB | Age: 35
End: 2020-10-20
Attending: FAMILY MEDICINE
Payer: COMMERCIAL

## 2020-10-20 ENCOUNTER — NURSE ONLY (OUTPATIENT)
Dept: LAB | Age: 35
End: 2020-10-20
Attending: FAMILY MEDICINE
Payer: COMMERCIAL

## 2020-10-20 DIAGNOSIS — E78.00 HYPERCHOLESTEREMIA: ICD-10-CM

## 2020-10-20 DIAGNOSIS — F50.81 BINGE EATING DISORDER: ICD-10-CM

## 2020-10-20 PROCEDURE — 84436 ASSAY OF TOTAL THYROXINE: CPT

## 2020-10-20 PROCEDURE — 80061 LIPID PANEL: CPT

## 2020-10-20 PROCEDURE — 84100 ASSAY OF PHOSPHORUS: CPT

## 2020-10-20 PROCEDURE — 93005 ELECTROCARDIOGRAM TRACING: CPT

## 2020-10-20 PROCEDURE — 36415 COLL VENOUS BLD VENIPUNCTURE: CPT

## 2020-10-20 PROCEDURE — 84443 ASSAY THYROID STIM HORMONE: CPT

## 2020-10-20 PROCEDURE — 83735 ASSAY OF MAGNESIUM: CPT

## 2020-10-20 PROCEDURE — 93010 ELECTROCARDIOGRAM REPORT: CPT | Performed by: INTERNAL MEDICINE

## 2020-10-20 PROCEDURE — 84480 ASSAY TRIIODOTHYRONINE (T3): CPT

## 2020-10-20 PROCEDURE — 85025 COMPLETE CBC W/AUTO DIFF WBC: CPT

## 2020-10-20 PROCEDURE — 82150 ASSAY OF AMYLASE: CPT

## 2020-10-20 PROCEDURE — 80053 COMPREHEN METABOLIC PANEL: CPT

## 2020-10-20 PROCEDURE — 84439 ASSAY OF FREE THYROXINE: CPT

## 2021-01-15 DIAGNOSIS — F41.1 ANXIETY STATE: ICD-10-CM

## 2021-01-18 RX ORDER — ZALEPLON 10 MG/1
10 CAPSULE ORAL NIGHTLY
Qty: 90 CAPSULE | Refills: 0 | Status: SHIPPED | OUTPATIENT
Start: 2021-01-18 | End: 2021-05-10

## 2021-01-25 ENCOUNTER — TELEMEDICINE (OUTPATIENT)
Dept: FAMILY MEDICINE CLINIC | Facility: CLINIC | Age: 36
End: 2021-01-25
Payer: COMMERCIAL

## 2021-01-25 DIAGNOSIS — G43.109 MIGRAINE WITH AURA AND WITHOUT STATUS MIGRAINOSUS, NOT INTRACTABLE: ICD-10-CM

## 2021-01-25 DIAGNOSIS — E66.09 CLASS 1 OBESITY DUE TO EXCESS CALORIES WITHOUT SERIOUS COMORBIDITY WITH BODY MASS INDEX (BMI) OF 33.0 TO 33.9 IN ADULT: ICD-10-CM

## 2021-01-25 DIAGNOSIS — F32.A DEPRESSION, UNSPECIFIED DEPRESSION TYPE: Primary | ICD-10-CM

## 2021-01-25 DIAGNOSIS — F50.81 BINGE EATING DISORDER: ICD-10-CM

## 2021-01-25 PROCEDURE — 99214 OFFICE O/P EST MOD 30 MIN: CPT | Performed by: FAMILY MEDICINE

## 2021-01-25 RX ORDER — FLUOXETINE HYDROCHLORIDE 20 MG/1
60 CAPSULE ORAL DAILY
Qty: 270 CAPSULE | Refills: 1 | Status: SHIPPED | OUTPATIENT
Start: 2021-01-25 | End: 2021-07-14

## 2021-01-25 RX ORDER — BUPROPION HYDROCHLORIDE 300 MG/1
300 TABLET ORAL EVERY MORNING
Qty: 90 TABLET | Refills: 1 | Status: SHIPPED | OUTPATIENT
Start: 2021-01-25 | End: 2021-07-12

## 2021-01-25 NOTE — PROGRESS NOTES
Subjective     HPI:   Hien Kirk verbally consents to a Virtual/Telephone Check-In service on 01/25/21. Patient understands and accepts financial responsibility for any deductible, co-insurance and/or co-pays associated with this service.  This visit is

## 2021-04-09 DIAGNOSIS — Z23 NEED FOR VACCINATION: ICD-10-CM

## 2021-05-10 DIAGNOSIS — F41.1 ANXIETY STATE: ICD-10-CM

## 2021-05-10 RX ORDER — ZALEPLON 10 MG/1
10 CAPSULE ORAL NIGHTLY
Qty: 90 CAPSULE | Refills: 0 | Status: SHIPPED | OUTPATIENT
Start: 2021-05-10 | End: 2021-08-25

## 2021-07-05 DIAGNOSIS — F32.A DEPRESSION, UNSPECIFIED DEPRESSION TYPE: ICD-10-CM

## 2021-07-12 NOTE — TELEPHONE ENCOUNTER
Pt needs appt before any refills. Please have them call to schedule. Thanks   she has not read [employer] reminder, please call to schedule thanks.

## 2021-07-13 ENCOUNTER — TELEMEDICINE (OUTPATIENT)
Dept: FAMILY MEDICINE CLINIC | Facility: CLINIC | Age: 36
End: 2021-07-13
Payer: COMMERCIAL

## 2021-07-13 DIAGNOSIS — F50.81 BINGE EATING DISORDER: ICD-10-CM

## 2021-07-13 DIAGNOSIS — F32.A DEPRESSION, UNSPECIFIED DEPRESSION TYPE: ICD-10-CM

## 2021-07-13 DIAGNOSIS — F32.A DEPRESSION, UNSPECIFIED DEPRESSION TYPE: Primary | ICD-10-CM

## 2021-07-13 DIAGNOSIS — F41.1 ANXIETY STATE: ICD-10-CM

## 2021-07-13 DIAGNOSIS — G47.00 INSOMNIA, UNSPECIFIED TYPE: ICD-10-CM

## 2021-07-13 DIAGNOSIS — G43.109 MIGRAINE WITH AURA AND WITHOUT STATUS MIGRAINOSUS, NOT INTRACTABLE: ICD-10-CM

## 2021-07-13 PROCEDURE — 99213 OFFICE O/P EST LOW 20 MIN: CPT | Performed by: FAMILY MEDICINE

## 2021-07-13 RX ORDER — BUPROPION HYDROCHLORIDE 300 MG/1
TABLET ORAL
Qty: 90 TABLET | Refills: 1 | Status: SHIPPED | OUTPATIENT
Start: 2021-07-13 | End: 2021-12-22

## 2021-07-13 NOTE — PROGRESS NOTES
Subjective     HPI:   Andrew Humphrey verbally consents to a Virtual/Telephone Check-In service on 07/13/21. Patient understands and accepts financial responsibility for any deductible, co-insurance and/or co-pays associated with this service.  This visit is

## 2021-07-14 RX ORDER — FLUOXETINE HYDROCHLORIDE 20 MG/1
CAPSULE ORAL
Qty: 270 CAPSULE | Refills: 1 | Status: SHIPPED | OUTPATIENT
Start: 2021-07-14 | End: 2022-01-10

## 2021-07-14 RX ORDER — ALPRAZOLAM 0.25 MG/1
TABLET ORAL
Qty: 30 TABLET | Refills: 0 | Status: SHIPPED | OUTPATIENT
Start: 2021-07-14

## 2021-08-25 DIAGNOSIS — F41.1 ANXIETY STATE: ICD-10-CM

## 2021-08-25 RX ORDER — ZALEPLON 10 MG/1
CAPSULE ORAL
Qty: 90 CAPSULE | Refills: 0 | Status: SHIPPED | OUTPATIENT
Start: 2021-08-25 | End: 2021-11-23

## 2021-08-25 NOTE — TELEPHONE ENCOUNTER
LOV 7/13/21  No pending appt. LF 5/10/21 for #90  Follow up due in January  Please send if appropriate.

## 2021-10-05 NOTE — PROGRESS NOTES
Binge eating disorder    Patient is here with a known diagnosis of depression, anxiety and a binge eating disorder. She she has been seeing her counselor on a regular basis.  She had previously been on Vyvanse for her binge eating disorder but had stopped i

## 2021-10-07 ENCOUNTER — TELEPHONE (OUTPATIENT)
Dept: FAMILY MEDICINE CLINIC | Facility: CLINIC | Age: 36
End: 2021-10-07

## 2021-11-05 DIAGNOSIS — F50.81 BINGE EATING DISORDER: ICD-10-CM

## 2021-11-16 ENCOUNTER — HOSPITAL ENCOUNTER (OUTPATIENT)
Age: 36
Discharge: HOME | End: 2021-11-16
Payer: COMMERCIAL

## 2021-11-16 VITALS — OXYGEN SATURATION: 97 % | HEART RATE: 84 BPM | RESPIRATION RATE: 18 BRPM | TEMPERATURE: 98 F

## 2021-11-16 DIAGNOSIS — J06.9 VIRAL URI: ICD-10-CM

## 2021-11-16 DIAGNOSIS — J02.9 ACUTE VIRAL PHARYNGITIS: ICD-10-CM

## 2021-11-16 DIAGNOSIS — Z20.822 ENCOUNTER FOR LABORATORY TESTING FOR COVID-19 VIRUS: Primary | ICD-10-CM

## 2021-11-16 PROCEDURE — 99213 OFFICE O/P EST LOW 20 MIN: CPT | Performed by: NURSE PRACTITIONER

## 2021-11-16 PROCEDURE — 87880 STREP A ASSAY W/OPTIC: CPT | Performed by: NURSE PRACTITIONER

## 2021-11-16 PROCEDURE — U0002 COVID-19 LAB TEST NON-CDC: HCPCS | Performed by: NURSE PRACTITIONER

## 2021-11-16 RX ORDER — FLUTICASONE PROPIONATE 50 MCG
2 SPRAY, SUSPENSION (ML) NASAL DAILY
Qty: 16 G | Refills: 0 | Status: SHIPPED | OUTPATIENT
Start: 2021-11-16 | End: 2021-12-16

## 2021-11-17 NOTE — ED PROVIDER NOTES
Patient Seen in: [facility] [address] [place] [address]      History   Patient presents with:  Sore Throat    Stated Complaint: Testing    Subjective:   39-year-old female presents today with complaints of sore throat for 1 week.   States  was exposed to COVID-19 a Appearance: She is well-developed. HENT:      Head: Normocephalic. Right Ear: Tympanic membrane and ear canal normal.      Left Ear: Tympanic membrane and ear canal normal.      Nose: Mucosal edema present.       Mouth/Throat:      Pharynx: Uvula mi List    START taking these medications    fluticasone propionate 50 MCG/ACT Nasal Suspension  2 sprays by Nasal route daily.   Qty: 16 g Refills: 0

## 2021-11-19 ENCOUNTER — HOSPITAL ENCOUNTER (OUTPATIENT)
Age: 36
Discharge: HOME | End: 2021-11-19
Payer: COMMERCIAL

## 2021-11-19 VITALS
RESPIRATION RATE: 16 BRPM | SYSTOLIC BLOOD PRESSURE: 143 MMHG | DIASTOLIC BLOOD PRESSURE: 97 MMHG | HEIGHT: 68 IN | BODY MASS INDEX: 34.86 KG/M2 | TEMPERATURE: 97 F | WEIGHT: 230 LBS | HEART RATE: 87 BPM | OXYGEN SATURATION: 97 %

## 2021-11-19 DIAGNOSIS — B34.9 VIRAL SYNDROME: Primary | ICD-10-CM

## 2021-11-19 DIAGNOSIS — Z11.52 ENCOUNTER FOR SCREENING FOR COVID-19: ICD-10-CM

## 2021-11-19 PROCEDURE — 99213 OFFICE O/P EST LOW 20 MIN: CPT | Performed by: PHYSICIAN ASSISTANT

## 2021-11-19 PROCEDURE — U0002 COVID-19 LAB TEST NON-CDC: HCPCS | Performed by: PHYSICIAN ASSISTANT

## 2021-11-20 NOTE — ED PROVIDER NOTES
Patient Seen in: [facility]      History   Patient presents with:  Cough  Headache    Stated Complaint: fatigue cough headache     Subjective:   HPI    30-year-old female presents to the IC for Covid test.  Patient had a negative Covid test 2 Mouth: Mucous membranes are moist.   Eyes:      Conjunctiva/sclera: Conjunctivae normal.   Cardiovascular:      Rate and Rhythm: Normal rate and regular rhythm.    Pulmonary:      Effort: Pulmonary effort is normal.      Breath sounds: Normal breath sound

## 2021-11-20 NOTE — ED INITIAL ASSESSMENT (HPI)
Patient was exposed to Covid on Saturday. Was here on 11/16/22 and had a negative covid test. States she continues to have a cough and headaches.  Would like another Covid test.

## 2021-11-22 DIAGNOSIS — F41.1 ANXIETY STATE: ICD-10-CM

## 2021-11-23 RX ORDER — ZALEPLON 10 MG/1
CAPSULE ORAL
Qty: 90 CAPSULE | Refills: 0 | Status: SHIPPED | OUTPATIENT
Start: 2021-11-23

## 2021-11-23 NOTE — TELEPHONE ENCOUNTER
Medication: ZALEPLON      Dose:10MG  Times per day: ONCE AT BEDTIME    Last office visit: 10/05/2021    Due back to office:  11/15/2021  Future office visit: N/A- none scheduled   Has this been refilled since last office visit: *8/25/2021 Last filled date*

## 2021-12-08 DIAGNOSIS — F50.81 BINGE EATING DISORDER: ICD-10-CM

## 2021-12-10 NOTE — TELEPHONE ENCOUNTER
LOV: 10/5/21  for: binge eating disorder  Patient advised to RTC on:  6 weeks  Previous Rx:  Vyvanse 40mgà Sig: Take 1 capsule by mouth every morning. Disp #30/0 refills.   LF: 11/5/21

## 2021-12-21 DIAGNOSIS — F32.A DEPRESSION, UNSPECIFIED DEPRESSION TYPE: ICD-10-CM

## 2021-12-22 RX ORDER — BUPROPION HYDROCHLORIDE 300 MG/1
TABLET ORAL
Qty: 90 TABLET | Refills: 0 | Status: SHIPPED | OUTPATIENT
Start: 2021-12-22

## 2021-12-22 NOTE — TELEPHONE ENCOUNTER
LOV: 10/5/21 (medication follow up)  Last Refill: 7/13/21, #90, 1 RF  Next OV: n/a    Please authorize if acceptable. Thank you!

## 2022-01-09 DIAGNOSIS — F32.A DEPRESSION, UNSPECIFIED DEPRESSION TYPE: ICD-10-CM

## 2022-01-10 DIAGNOSIS — F50.81 BINGE EATING DISORDER: ICD-10-CM

## 2022-01-10 RX ORDER — FLUOXETINE HYDROCHLORIDE 20 MG/1
CAPSULE ORAL
Qty: 270 CAPSULE | Refills: 0 | Status: SHIPPED | OUTPATIENT
Start: 2022-01-10 | End: 2022-02-03 | Stop reason: ALTCHOICE

## 2022-01-10 NOTE — TELEPHONE ENCOUNTER
LOV: 10/5/21  Last Refill: 7/14/21, #270, 1 RF  Next OV: 2/3/22    Please authorize if acceptable. Thank you!

## 2022-02-03 ENCOUNTER — TELEMEDICINE (OUTPATIENT)
Dept: FAMILY MEDICINE CLINIC | Facility: CLINIC | Age: 37
End: 2022-02-03
Payer: COMMERCIAL

## 2022-02-03 DIAGNOSIS — F32.A DEPRESSION, UNSPECIFIED DEPRESSION TYPE: Primary | ICD-10-CM

## 2022-02-03 DIAGNOSIS — F50.81 BINGE EATING DISORDER: ICD-10-CM

## 2022-02-03 DIAGNOSIS — G43.109 MIGRAINE WITH AURA AND WITHOUT STATUS MIGRAINOSUS, NOT INTRACTABLE: ICD-10-CM

## 2022-02-03 DIAGNOSIS — F41.1 ANXIETY STATE: ICD-10-CM

## 2022-02-03 PROCEDURE — 99214 OFFICE O/P EST MOD 30 MIN: CPT | Performed by: FAMILY MEDICINE

## 2022-02-03 NOTE — PROGRESS NOTES
Subjective     HPI:   Roxie Echavarria verbally consents to a Virtual/Telephone Check-In service on 02/03/22. Patient understands and accepts financial responsibility for any deductible, co-insurance and/or co-pays associated with this service. This visit is conducted using Telemedicine with live, interactive video and audio. I returned Roxie Echavarria call by secure video chat, verified date of birth, and discussed their current concerns:     Patient is here with a known diagnosis of depression, anxiety and a binge eating disorder. She she has been seeing her counselor on a regular basis. She has been taking Vyvanse 40 mg daily and has lost approximately 20 pounds. She has had no appreciable side effects. For her depression and anxiety she has been taking fluoxetine 20 mg 4 capsules daily along with Wellbutrin 300 mg with a mixed result. She would like to try weaning off fluoxetine and starting sertraline. We did discuss a taper schedule that would involve taking fluoxetine 40 mg for a week then stopping. She would start sertraline 25 mg initially for 1 week and then changed to 50 mg immediately. She should follow-up with me in 3 weeks. She does denied homicidal or suicidal ideation. Finally she does have a history of chronic migraines for which she will use Imitrex    No Further Nursing Notes to Review  Medications Reviewed           REVIEW OF SYSTEMS:  Pertinent items are noted in HPI. Physical Exam:  Speaking in full sentences comfortably, Normal work of breathing and No obvious change in her affect        Assessment    Diagnoses and all orders for this visit:    Depression, unspecified depression type  -     sertraline 50 MG Oral Tab; Take 0.5 tablets (25 mg total) by mouth daily for 8 days, THEN 1 tablet (50 mg total) daily for 22 days. Anxiety state  -     sertraline 50 MG Oral Tab;  Take 0.5 tablets (25 mg total) by mouth daily for 8 days, THEN 1 tablet (50 mg total) daily for 22 days.    Binge eating disorder    Migraine with aura and without status migrainosus, not intractable         Follow up: 3 weeks  Time of visit: 25 minutes including documentation    Radha Perea MD

## 2022-03-01 RX ORDER — ZALEPLON 10 MG/1
CAPSULE ORAL
Qty: 90 CAPSULE | Refills: 0 | Status: SHIPPED | OUTPATIENT
Start: 2022-03-01

## 2022-03-16 RX ORDER — ALPRAZOLAM 0.25 MG/1
TABLET ORAL
Qty: 30 TABLET | Refills: 0 | OUTPATIENT
Start: 2022-03-16

## 2022-03-17 RX ORDER — SUMATRIPTAN 100 MG/1
TABLET, FILM COATED ORAL
Qty: 9 TABLET | Refills: 0 | Status: SHIPPED | OUTPATIENT
Start: 2022-03-17

## 2022-03-17 NOTE — TELEPHONE ENCOUNTER
SUMAtriptan Succinate 100 MG Oral Tab    Lisdexamfetamine Dimesylate (VYVANSE) 40 MG Oral Cap    Please see pended medications. Please sign if appropriate.       Thank you      Last OV: 02/03/2022      Last refill: Sumatriptan 04/15/2020 for 9/5 refills    VyVanse 02/14/2022 for 30 tabs

## 2022-03-22 NOTE — TELEPHONE ENCOUNTER
LOV was a tele medicine 02/03/2022. Per office notes from 02/03/2022. Pt was suppose to follow up three weeks after starting medication. To date she has not followed up. I sent her a [facility] regarding follow up. Breath sounds are clear, no distress present, no wheeze, rales, rhonchi or tachypnea. Normal rate and effort.

## 2022-04-04 ENCOUNTER — PATIENT MESSAGE (OUTPATIENT)
Dept: FAMILY MEDICINE CLINIC | Facility: CLINIC | Age: 37
End: 2022-04-04

## 2022-04-05 NOTE — TELEPHONE ENCOUNTER
From: Miriam Trimble  To: Rebecca Chiang MD  Sent: 4/4/2022 5:49 PM CDT  Subject: Location change    Hi Dr. King Gongora,    I live in [place] and the new location of your office is relatively far for me. [facility] [address]. Is there anyone there you could refer me to? Thank you!     Alysha Baig

## 2022-04-11 ENCOUNTER — PATIENT MESSAGE (OUTPATIENT)
Dept: FAMILY MEDICINE CLINIC | Facility: CLINIC | Age: 37
End: 2022-04-11

## 2022-04-12 RX ORDER — BUPROPION HYDROCHLORIDE 300 MG/1
TABLET ORAL
Qty: 90 TABLET | Refills: 3 | Status: SHIPPED | OUTPATIENT
Start: 2022-04-12

## 2022-04-12 RX ORDER — FLUOXETINE HYDROCHLORIDE 20 MG/1
60 CAPSULE ORAL DAILY
Qty: 270 CAPSULE | Refills: 1 | Status: CANCELLED | OUTPATIENT
Start: 2022-04-12

## 2022-04-12 RX ORDER — FLUOXETINE HYDROCHLORIDE 20 MG/1
CAPSULE ORAL
Qty: 270 CAPSULE | Refills: 0 | Status: SHIPPED | OUTPATIENT
Start: 2022-04-12 | End: 2022-04-12

## 2022-04-12 NOTE — TELEPHONE ENCOUNTER
04/12/2022  LMTCB  Please make sure the pt is off the fluoxetine. She should be on sertraline 50 mg 1 daily and Bupropion Er 300 mg 1 every morning.

## 2022-04-12 NOTE — TELEPHONE ENCOUNTER
From: Nate Newell  To: Radha Perea MD  Sent: 4/11/2022 9:41 AM CDT  Subject: Prescription Refills    Hello - I have two prescription refills that have been denied and canceled. I have an appointment on 5/26/22 with an [facility] [employer] [facility] doctor closer to my home - is it possible to get my prescriptions approved, please? I have been out of the sertraline for a few days. Please let me know if you need any other information before the medications can be approved. Thank you!

## 2022-04-14 NOTE — TELEPHONE ENCOUNTER
I spoke with pt and verified she is off of the Fluoxetine and only taking the Bupropion and Sertraline. Pt aware to call with any questions or concerns.

## 2022-04-15 RX ORDER — SUMATRIPTAN 100 MG/1
TABLET, FILM COATED ORAL
Qty: 9 TABLET | Refills: 0 | Status: SHIPPED | OUTPATIENT
Start: 2022-04-15

## 2022-04-15 NOTE — TELEPHONE ENCOUNTER
LOV: 2/3/22 (televisit-depression)  Last Refill: 4/6/22, #30, 0 RF  Next OV: N/A    Too early for refill.

## 2022-04-22 NOTE — TELEPHONE ENCOUNTER
Pt states order is not at pharmacy as expected. Pt states she never picked up the prescription that was sent 4/6/2022. Please advise.

## 2022-04-22 NOTE — TELEPHONE ENCOUNTER
Call to [facility]. S/w Srinivasa March sts they did NOT receive an order for Vyvanse, dated 4/6/22. Re-order pended.

## 2022-05-13 ENCOUNTER — TELEPHONE (OUTPATIENT)
Dept: FAMILY MEDICINE CLINIC | Facility: CLINIC | Age: 37
End: 2022-05-13

## 2022-05-13 NOTE — TELEPHONE ENCOUNTER
Given her BMI, would recommend evaluation at [address] to discuss treatment options like antibody infusion.

## 2022-05-13 NOTE — TELEPHONE ENCOUNTER
Pt was covid+ Wednesday. She would like to know if there is any med she can take to help. Please advise. Thank you.

## 2022-05-13 NOTE — TELEPHONE ENCOUNTER
COVID + on: 5/11/22  Date of ONSET: 5/10/22  CURRENTLY:  Cough:+ yes with +mucus production  SOB/Chest tightness/Chest pain: NO  SpO2: does not have one  Headache: + yes frontal and over eyes  Fever: only on Wed. Body Ache: yes  Fatigue: yes  Nausea/Vomiting/Diarrhea:NO  Nasal: +congestion  Taste/Smell: OK  Throat: +sore  Ears: Right ear hurts  Skin: ok  Appetite/Hydrating: OK    Supportive MEDICATIONS:Daquil/ Advil    Patient is aware of the current CDC guidelines related to quarantine. Patient was advised to seek immediate medical attention if symptoms worsen; Especially if SOB, tightness of chest, and/or chest pain go to the emergency department. Patient verbalized understanding, no further questions or concerns at this time. Should she go for monoclonal antibodies, or can she have paxlovid?

## 2022-05-14 ENCOUNTER — HOSPITAL ENCOUNTER (OUTPATIENT)
Age: 37
Discharge: HOME | End: 2022-05-14
Payer: COMMERCIAL

## 2022-05-14 VITALS
RESPIRATION RATE: 18 BRPM | HEART RATE: 70 BPM | OXYGEN SATURATION: 97 % | HEIGHT: 68 IN | SYSTOLIC BLOOD PRESSURE: 138 MMHG | WEIGHT: 230 LBS | DIASTOLIC BLOOD PRESSURE: 102 MMHG | BODY MASS INDEX: 34.86 KG/M2 | TEMPERATURE: 97 F

## 2022-05-14 DIAGNOSIS — U07.1 COVID-19 VIRUS INFECTION: Primary | ICD-10-CM

## 2022-05-14 LAB — SARS-COV-2 RNA RESP QL NAA+PROBE: DETECTED

## 2022-05-14 RX ORDER — BENZONATATE 100 MG/1
100 CAPSULE ORAL 3 TIMES DAILY PRN
Qty: 30 CAPSULE | Refills: 0 | Status: SHIPPED | OUTPATIENT
Start: 2022-05-14 | End: 2022-06-13

## 2022-05-14 RX ORDER — BEBTELOVIMAB 87.5 MG/ML
175 INJECTION, SOLUTION INTRAVENOUS ONCE
Status: DISCONTINUED | OUTPATIENT
Start: 2022-05-14 | End: 2022-05-14

## 2022-05-14 NOTE — ED INITIAL ASSESSMENT (HPI)
Patient c/o nasal congestion, headache, cough and loss of taste and smell for 5 days. Tested + for Covid at home on 5/11/22. Would like to have antibodies.

## 2022-05-14 NOTE — ED QUICK NOTES
Patient read antibody information sheet and spoke to her  on the phone. States she would not want the antibodies at this time.

## 2022-05-16 NOTE — TELEPHONE ENCOUNTER
5/14/22  notes state pt declined Ab infusion. Covid+ 5/11 w sx onset 5/10    Call to pt's cell reaches identified voice mail. Per hipaa consent, left vmm req call back to triage nurse today with symptom update.  Provided ofc phone hours/contact number

## 2022-05-17 NOTE — TELEPHONE ENCOUNTER
5/14/22  notes state pt declined Ab infusion  Covid+ 5/11 w sx onset 5/10    Call to pt's cell reaches identified voice mail. Per hip[employer] consent, left vmm req call back to triage nurse today with symptom update for dr Marianne Carr.  Provided ofc phone hours/contact number

## 2022-05-18 NOTE — TELEPHONE ENCOUNTER
5/14/22  notes state pt declined Ab infusion  Covid+ 5/11 w sx onset 5/10  Call to pt's cell reaches identified voice mail.  Per hipaa consent, left vmm req call back to triage nurse today with symptom update for dr Montenergo Files message sent w same request

## 2022-05-19 NOTE — TELEPHONE ENCOUNTER
Please advise if we need to continue outreaches at this point? Called x 3 and sent mcm. Still have not heard from pt. She is finished with quarantine at this point. Sx onset was 5.10, + 5.11  Please advise thanks.

## 2022-05-27 ENCOUNTER — TELEPHONE (OUTPATIENT)
Dept: FAMILY MEDICINE CLINIC | Facility: CLINIC | Age: 37
End: 2022-05-27

## 2022-05-27 NOTE — TELEPHONE ENCOUNTER
Pt called to schedule a physical and first available OV is 8/15/22. Pt states MM said she wanted to see her sooner and she could be squeezed in somewhere? Please advise.

## 2022-05-27 NOTE — TELEPHONE ENCOUNTER
Per Office visit 05/26/2022   \"Next follow up due in 3 months from today / annual physical due then\"    Routing to .  Thank you

## 2022-06-08 ENCOUNTER — HOSPITAL ENCOUNTER (OUTPATIENT)
Dept: ULTRASOUND IMAGING | Age: 37
Discharge: HOME | End: 2022-06-08
Attending: FAMILY MEDICINE
Payer: COMMERCIAL

## 2022-06-08 DIAGNOSIS — R22.1 NECK FULLNESS: ICD-10-CM

## 2022-06-08 DIAGNOSIS — Z83.49 FAMILY HISTORY OF THYROID DISEASE: ICD-10-CM

## 2022-06-08 PROCEDURE — 76536 US EXAM OF HEAD AND NECK: CPT | Performed by: FAMILY MEDICINE

## 2022-06-10 DIAGNOSIS — F41.1 ANXIETY STATE: ICD-10-CM

## 2022-06-12 RX ORDER — ZALEPLON 10 MG/1
CAPSULE ORAL
Qty: 90 CAPSULE | Refills: 0 | Status: SHIPPED | OUTPATIENT
Start: 2022-06-12

## 2022-06-13 RX ORDER — ALPRAZOLAM 0.25 MG/1
TABLET ORAL
Qty: 30 TABLET | Refills: 0 | OUTPATIENT
Start: 2022-06-13

## 2022-07-18 DIAGNOSIS — F41.1 ANXIETY STATE: ICD-10-CM

## 2022-07-18 DIAGNOSIS — F32.A DEPRESSION, UNSPECIFIED DEPRESSION TYPE: ICD-10-CM

## 2022-07-19 DIAGNOSIS — F32.A DEPRESSION, UNSPECIFIED DEPRESSION TYPE: ICD-10-CM

## 2022-07-19 DIAGNOSIS — F41.1 ANXIETY STATE: ICD-10-CM

## 2022-07-19 NOTE — TELEPHONE ENCOUNTER
PT CALLED AND ADV NEEDS REFILL OF     sertraline 50 MG Oral Tab     ** [facility] **      THANK YOU

## 2022-07-19 NOTE — TELEPHONE ENCOUNTER
No refill protocol for this medication. Last refill: 4/12/2022 #30 with 1 refill  Last Visit: 5/26/2022  Next Visit:   Future Appointments   Date Time Provider Matthew Sullivan   8/18/2022  1:20 PM Zafar Márquez MD [facility] JOSS Camacho         Forward to Dr. Julianna Mendoza please advise on refills. Thanks.

## 2022-09-04 DIAGNOSIS — F50.81 BINGE EATING DISORDER: ICD-10-CM

## 2022-09-06 ENCOUNTER — PATIENT MESSAGE (OUTPATIENT)
Dept: FAMILY MEDICINE CLINIC | Facility: CLINIC | Age: 37
End: 2022-09-06

## 2022-09-06 DIAGNOSIS — F50.81 BINGE EATING DISORDER: ICD-10-CM

## 2022-09-06 NOTE — TELEPHONE ENCOUNTER
LOV 02/03/2022  Pt was to follow up in 3 weeks and has not followed up. I sent a [facility] to pt to make an appt for a follow up.

## 2022-09-07 DIAGNOSIS — F50.81 BINGE EATING DISORDER: ICD-10-CM

## 2022-09-07 NOTE — TELEPHONE ENCOUNTER
Aleksandra Adams [facility] confirmed pt's PCP was changed from dr Heather Durant to dr Keo Ca. Pt confirmed same info in 9/6/22 1125 am response.    Called mariana/emmie/margarita/pharmacist w instructions to cancel 9/6/22 vyvanse order from dr Heather Durant due to pt has new PCP-has been advised to contact dr trujillo's ofc w refill request.

## 2022-09-07 NOTE — TELEPHONE ENCOUNTER
See 9/6 refill request and messg from pt confirming she is no longer a pt of dr Erwin Zaldivar, dr Azul Todd is her new PCP.  messg to pt to contact dr trujillo's ofc with vyvanse refill request

## 2022-09-07 NOTE — TELEPHONE ENCOUNTER
vyvanse order 9/6/22 from dr Shahrzad Steen cancelled w pharmacy due to pt sts is no longer a pt of dr Mack Carmona now seeing dr Yvonne Schulz. Record confirms dr Reba Moseley listed as PCP. mychart message to pt advising to contact dr trujillo's ofc for vyvanse refill.

## 2022-09-08 ENCOUNTER — PATIENT MESSAGE (OUTPATIENT)
Dept: FAMILY MEDICINE CLINIC | Facility: CLINIC | Age: 37
End: 2022-09-08

## 2022-09-08 DIAGNOSIS — F50.81 BINGE EATING DISORDER: ICD-10-CM

## 2022-09-08 RX ORDER — LISDEXAMFETAMINE DIMESYLATE CAPSULES 40 MG/1
40 CAPSULE ORAL EVERY MORNING
Qty: 30 CAPSULE | Refills: 0 | OUTPATIENT
Start: 2022-09-08

## 2022-09-12 NOTE — TELEPHONE ENCOUNTER
Please review pt's [facility] - please send to [facility]    Order(s) pending, please review. Thank you.   [facility]

## 2022-10-10 DIAGNOSIS — F50.81 BINGE EATING DISORDER: ICD-10-CM

## 2022-10-10 DIAGNOSIS — F41.1 ANXIETY STATE: ICD-10-CM

## 2022-10-10 RX ORDER — ALPRAZOLAM 0.25 MG/1
TABLET ORAL
Qty: 30 TABLET | Refills: 0 | OUTPATIENT
Start: 2022-10-10

## 2022-10-10 RX ORDER — ZALEPLON 10 MG/1
10 CAPSULE ORAL NIGHTLY
Qty: 90 CAPSULE | Refills: 0 | OUTPATIENT
Start: 2022-10-10

## 2022-10-17 ENCOUNTER — HOSPITAL ENCOUNTER (OUTPATIENT)
Age: 37
Discharge: HOME | End: 2022-10-17
Payer: COMMERCIAL

## 2022-10-17 VITALS
RESPIRATION RATE: 18 BRPM | WEIGHT: 230 LBS | DIASTOLIC BLOOD PRESSURE: 100 MMHG | HEART RATE: 72 BPM | OXYGEN SATURATION: 97 % | TEMPERATURE: 99 F | BODY MASS INDEX: 34.86 KG/M2 | HEIGHT: 68 IN | SYSTOLIC BLOOD PRESSURE: 130 MMHG

## 2022-10-17 DIAGNOSIS — J02.8 VIRAL SORE THROAT: Primary | ICD-10-CM

## 2022-10-17 DIAGNOSIS — B97.89 VIRAL SORE THROAT: Primary | ICD-10-CM

## 2022-10-17 LAB — S PYO AG THROAT QL: NEGATIVE

## 2022-10-17 PROCEDURE — 99213 OFFICE O/P EST LOW 20 MIN: CPT | Performed by: NURSE PRACTITIONER

## 2022-10-17 PROCEDURE — 87880 STREP A ASSAY W/OPTIC: CPT | Performed by: NURSE PRACTITIONER

## 2022-10-31 ENCOUNTER — OFFICE VISIT (OUTPATIENT)
Dept: FAMILY MEDICINE CLINIC | Facility: CLINIC | Age: 37
End: 2022-10-31
Payer: COMMERCIAL

## 2022-10-31 VITALS
HEART RATE: 87 BPM | OXYGEN SATURATION: 98 % | WEIGHT: 238.63 LBS | RESPIRATION RATE: 16 BRPM | SYSTOLIC BLOOD PRESSURE: 126 MMHG | DIASTOLIC BLOOD PRESSURE: 82 MMHG | BODY MASS INDEX: 36 KG/M2 | TEMPERATURE: 98 F

## 2022-10-31 DIAGNOSIS — F32.0 CURRENT MILD EPISODE OF MAJOR DEPRESSIVE DISORDER, UNSPECIFIED WHETHER RECURRENT (HCC): ICD-10-CM

## 2022-10-31 DIAGNOSIS — Z76.0 ENCOUNTER FOR MEDICATION REFILL: ICD-10-CM

## 2022-10-31 DIAGNOSIS — Z12.11 ENCOUNTER FOR SCREENING COLONOSCOPY: ICD-10-CM

## 2022-10-31 DIAGNOSIS — Z00.00 ANNUAL PHYSICAL EXAM: Primary | ICD-10-CM

## 2022-10-31 DIAGNOSIS — F41.1 ANXIETY STATE: ICD-10-CM

## 2022-10-31 DIAGNOSIS — F50.81 BINGE EATING DISORDER: ICD-10-CM

## 2022-10-31 DIAGNOSIS — N92.1 METRORRHAGIA: ICD-10-CM

## 2022-10-31 DIAGNOSIS — Z91.89 HIGH RISK FOR COLON CANCER: ICD-10-CM

## 2022-10-31 DIAGNOSIS — Z23 NEED FOR VACCINATION: ICD-10-CM

## 2022-10-31 PROCEDURE — 3079F DIAST BP 80-89 MM HG: CPT | Performed by: FAMILY MEDICINE

## 2022-10-31 PROCEDURE — 90686 IIV4 VACC NO PRSV 0.5 ML IM: CPT | Performed by: FAMILY MEDICINE

## 2022-10-31 PROCEDURE — 99213 OFFICE O/P EST LOW 20 MIN: CPT | Performed by: FAMILY MEDICINE

## 2022-10-31 PROCEDURE — 3074F SYST BP LT 130 MM HG: CPT | Performed by: FAMILY MEDICINE

## 2022-10-31 PROCEDURE — 90471 IMMUNIZATION ADMIN: CPT | Performed by: FAMILY MEDICINE

## 2022-10-31 PROCEDURE — 99395 PREV VISIT EST AGE 18-39: CPT | Performed by: FAMILY MEDICINE

## 2022-10-31 RX ORDER — SERTRALINE HYDROCHLORIDE 100 MG/1
100 TABLET, FILM COATED ORAL DAILY
Qty: 90 TABLET | Refills: 1 | Status: SHIPPED | OUTPATIENT
Start: 2022-10-31 | End: 2023-04-29

## 2022-10-31 RX ORDER — ALPRAZOLAM 0.25 MG/1
TABLET ORAL
Qty: 30 TABLET | Refills: 0 | Status: SHIPPED | OUTPATIENT
Start: 2022-10-31

## 2022-10-31 RX ORDER — ZALEPLON 10 MG/1
10 CAPSULE ORAL NIGHTLY
Qty: 90 CAPSULE | Refills: 1 | Status: SHIPPED | OUTPATIENT
Start: 2022-10-31 | End: 2023-04-29

## 2022-11-17 DIAGNOSIS — F50.81 BINGE EATING DISORDER: ICD-10-CM

## 2022-11-17 NOTE — TELEPHONE ENCOUNTER
Last refilled 11/9/22 to mail order    Called and spoke with patient who states she does not use [facility] order for this, needs sent to [facility]    [facility] but can not get through to person to cancel

## 2023-02-05 DIAGNOSIS — F50.81 BINGE EATING DISORDER: ICD-10-CM

## 2023-02-06 NOTE — TELEPHONE ENCOUNTER
LOV 10/31/22  Last refill on 01/09/23, for #30 caps, with 0 refills  Lisdexamfetamine Dimesylate (VYVANSE) 40 MG Oral Cap    No future appointments. Order(s) pending, please review. Thank you.

## 2023-02-10 DIAGNOSIS — F32.A DEPRESSION, UNSPECIFIED DEPRESSION TYPE: ICD-10-CM

## 2023-02-10 DIAGNOSIS — F41.1 ANXIETY STATE: ICD-10-CM

## 2023-02-11 NOTE — TELEPHONE ENCOUNTER
No refill protocol for this medication. Last refill: 10/31/2022 #90 with 1 refills  Last Visit: 10/31/2022  Next Visit: No future appointments. Forward to CORTEZ Lima please advise on refills. Thanks.

## 2023-02-17 ENCOUNTER — HOSPITAL ENCOUNTER (OUTPATIENT)
Age: 38
Discharge: HOME | End: 2023-02-17
Payer: COMMERCIAL

## 2023-02-17 VITALS
RESPIRATION RATE: 16 BRPM | HEART RATE: 72 BPM | DIASTOLIC BLOOD PRESSURE: 100 MMHG | OXYGEN SATURATION: 96 % | TEMPERATURE: 97 F | SYSTOLIC BLOOD PRESSURE: 139 MMHG

## 2023-02-17 DIAGNOSIS — J02.9 SORE THROAT: Primary | ICD-10-CM

## 2023-02-17 DIAGNOSIS — R09.81 SINUS CONGESTION: ICD-10-CM

## 2023-02-17 DIAGNOSIS — Z20.818 STREP THROAT EXPOSURE: ICD-10-CM

## 2023-02-17 LAB — S PYO AG THROAT QL: NEGATIVE

## 2023-02-17 PROCEDURE — 99213 OFFICE O/P EST LOW 20 MIN: CPT | Performed by: NURSE PRACTITIONER

## 2023-02-17 PROCEDURE — 87880 STREP A ASSAY W/OPTIC: CPT | Performed by: NURSE PRACTITIONER

## 2023-02-17 RX ORDER — FLUTICASONE PROPIONATE 50 MCG
2 SPRAY, SUSPENSION (ML) NASAL DAILY
Qty: 9.9 ML | Refills: 0 | Status: SHIPPED | OUTPATIENT
Start: 2023-02-17 | End: 2023-03-19

## 2023-02-17 RX ORDER — CEPHALEXIN 500 MG/1
500 CAPSULE ORAL 2 TIMES DAILY
Qty: 20 CAPSULE | Refills: 0 | Status: SHIPPED | OUTPATIENT
Start: 2023-02-17 | End: 2023-02-27

## 2023-02-17 NOTE — DISCHARGE INSTRUCTIONS
Interventions for sore throat: Warm salt water gargles 3 times daily. Mix a teaspoon of honey in the liquid such as juice or tea. Cough or throat drops. Drink plenty of fluids, mainly consisting of water. For the nasal/sinus congestion, try saline rinses such as the [place] pot. Also try hot steam showers, steam inhalations, cool-mist humidifier in the same room. Take an over-the-counter probiotic daily while on the antibiotics. Please read the attached discharge instructions. Go to your nearest ER for new or worsening symptoms.

## 2023-02-17 NOTE — ED INITIAL ASSESSMENT (HPI)
Pt with c/o sore throat since yesterday. Pt exposed to strep at home. Pt c/o ear pain and headache.   2 negative at home covid tests

## 2023-03-22 DIAGNOSIS — F32.A DEPRESSION, UNSPECIFIED DEPRESSION TYPE: ICD-10-CM

## 2023-03-22 RX ORDER — BUPROPION HYDROCHLORIDE 300 MG/1
300 TABLET ORAL EVERY MORNING
Qty: 90 TABLET | Refills: 3 | Status: SHIPPED | OUTPATIENT
Start: 2023-03-22

## 2023-04-04 NOTE — TELEPHONE ENCOUNTER
LOV: 10/5/21  for: Binge eating disorder  Patient advised to RTC on:  6 weeks  Previous Rx:  Vyvanse 40mgà Sig: take 1 capsule by mouth every morning. Disp #30/0 refills.   LF:  10/5/21    Pt left my chart message stating she is doing well and would like to Helical Rim Advancement Flap Text: The defect edges were debeveled with a #15 blade scalpel.  Given the location of the defect and the proximity to free margins (helical rim) a double helical rim advancement flap was deemed most appropriate.  Using a sterile surgical marker, the appropriate advancement flaps were drawn incorporating the defect and placing the expected incisions between the helical rim and antihelix where possible.  The area thus outlined was incised through and through with a #15 scalpel blade.  With a skin hook and iris scissors, the flaps were gently and sharply undermined and freed up.

## 2023-04-14 DIAGNOSIS — Z00.00 ANNUAL PHYSICAL EXAM: ICD-10-CM

## 2023-04-14 DIAGNOSIS — F32.0 CURRENT MILD EPISODE OF MAJOR DEPRESSIVE DISORDER, UNSPECIFIED WHETHER RECURRENT (HCC): ICD-10-CM

## 2023-04-14 DIAGNOSIS — F41.1 ANXIETY STATE: ICD-10-CM

## 2023-04-14 RX ORDER — SERTRALINE HYDROCHLORIDE 100 MG/1
100 TABLET, FILM COATED ORAL DAILY
Qty: 90 TABLET | Refills: 1 | Status: SHIPPED | OUTPATIENT
Start: 2023-04-14 | End: 2023-10-11

## 2023-04-14 NOTE — TELEPHONE ENCOUNTER
No refill protocol for this medication. Last refill: 10/31/2022 #90 with 1 refill  Last Visit: 3/01/2023  Next Visit: No future appointments. Forward to CORTEZ Mendieta please advise on refills. Thanks.

## 2023-05-03 ENCOUNTER — PATIENT MESSAGE (OUTPATIENT)
Dept: FAMILY MEDICINE CLINIC | Facility: CLINIC | Age: 38
End: 2023-05-03

## 2023-05-03 DIAGNOSIS — Z71.3 WEIGHT LOSS COUNSELING, ENCOUNTER FOR: Primary | ICD-10-CM

## 2023-05-03 NOTE — TELEPHONE ENCOUNTER
[facility] referral order placed    [facility] sent to pt  Notify me if not read by 05/10/23

## 2023-05-03 NOTE — TELEPHONE ENCOUNTER
From: Lance Molina  To: Ana Aguilar MD  Sent: 5/3/2023 12:19 PM CDT  Subject: Medication question    Hi Dr. Prema Cooney,    I am curious if I would be a candidate for Ozempic? I can make an appt to discuss if it is an option. Thanks!     Thad Found

## 2023-05-03 NOTE — TELEPHONE ENCOUNTER
LOV 03/01/23 with Dr. Martinez Neither    Please see pt's [facility]    Please advise, thank you

## 2023-05-03 NOTE — TELEPHONE ENCOUNTER
Please provide weight loss clinic referral for her. She will benefit from their comprehensive approach and they will be able to assess her candidacy with regarding to Ozempic and other options.

## 2023-05-21 DIAGNOSIS — F41.1 ANXIETY STATE: ICD-10-CM

## 2023-05-22 ENCOUNTER — PATIENT MESSAGE (OUTPATIENT)
Dept: FAMILY MEDICINE CLINIC | Facility: CLINIC | Age: 38
End: 2023-05-22

## 2023-05-22 DIAGNOSIS — Z00.00 ANNUAL PHYSICAL EXAM: ICD-10-CM

## 2023-05-22 DIAGNOSIS — F41.1 ANXIETY STATE: ICD-10-CM

## 2023-05-22 DIAGNOSIS — F32.0 CURRENT MILD EPISODE OF MAJOR DEPRESSIVE DISORDER, UNSPECIFIED WHETHER RECURRENT (HCC): ICD-10-CM

## 2023-05-22 NOTE — TELEPHONE ENCOUNTER
From: Trung Johansen  To: Jason Hawk MD  Sent: 5/22/2023 1:11 PM CDT  Subject: Zaleplon prescription    Hi Dr. Juliana Segura,    I submitted a refill request for my 10mg Zaleplon prescription and it says denied by provider. Can you please send a new script? Thanks.      Miya López

## 2023-05-22 NOTE — TELEPHONE ENCOUNTER
No refill protocol for this medication. Last refill: 10/31/2022 #90 with 1 refill  Last Visit: 3/01/2023  Next Visit: No future appointments. Forward to CORTEZ Lovell please advise on refills. Thanks.

## 2023-05-23 RX ORDER — ZALEPLON 10 MG/1
10 CAPSULE ORAL NIGHTLY
Qty: 90 CAPSULE | Refills: 0 | Status: SHIPPED | OUTPATIENT
Start: 2023-05-23 | End: 2023-11-19

## 2023-05-24 RX ORDER — ZALEPLON 10 MG/1
CAPSULE ORAL
Qty: 90 CAPSULE | Refills: 0 | OUTPATIENT
Start: 2023-05-24

## 2023-06-20 ENCOUNTER — HOSPITAL ENCOUNTER (OUTPATIENT)
Age: 38
Discharge: HOME | End: 2023-06-20
Payer: COMMERCIAL

## 2023-06-20 VITALS
DIASTOLIC BLOOD PRESSURE: 89 MMHG | SYSTOLIC BLOOD PRESSURE: 145 MMHG | OXYGEN SATURATION: 96 % | WEIGHT: 235 LBS | TEMPERATURE: 99 F | HEIGHT: 68 IN | BODY MASS INDEX: 35.61 KG/M2 | RESPIRATION RATE: 18 BRPM | HEART RATE: 91 BPM

## 2023-06-20 DIAGNOSIS — J02.0 STREPTOCOCCAL SORE THROAT: Primary | ICD-10-CM

## 2023-06-20 LAB — S PYO AG THROAT QL: POSITIVE

## 2023-06-20 PROCEDURE — 99213 OFFICE O/P EST LOW 20 MIN: CPT | Performed by: NURSE PRACTITIONER

## 2023-06-20 PROCEDURE — 87880 STREP A ASSAY W/OPTIC: CPT | Performed by: NURSE PRACTITIONER

## 2023-06-20 RX ORDER — AZITHROMYCIN 500 MG/1
500 TABLET, FILM COATED ORAL DAILY
Qty: 5 TABLET | Refills: 0 | Status: SHIPPED | OUTPATIENT
Start: 2023-06-20 | End: 2023-06-25

## 2023-06-20 NOTE — ED INITIAL ASSESSMENT (HPI)
Pt sts yesterday began with sore throat. Multiple family members with strep in the past several weeks.

## 2023-06-20 NOTE — DISCHARGE INSTRUCTIONS
Please take azithromycin as prescribed. Tylenol and ibuprofen. You are contagious until you have been on the antibiotics for 24 hours. Mucinex Insta soothe throat lozenges. Throw your toothbrush in 2 to 3 days. Follow closely with your primary.

## 2023-06-27 DIAGNOSIS — F50.81 BINGE EATING DISORDER: ICD-10-CM

## 2023-06-29 ENCOUNTER — HOSPITAL ENCOUNTER (OUTPATIENT)
Dept: MAMMOGRAPHY | Age: 38
Discharge: HOME | End: 2023-06-29
Attending: NURSE PRACTITIONER
Payer: COMMERCIAL

## 2023-06-29 ENCOUNTER — OFFICE VISIT (OUTPATIENT)
Dept: OBGYN CLINIC | Facility: CLINIC | Age: 38
End: 2023-06-29
Payer: COMMERCIAL

## 2023-06-29 VITALS
DIASTOLIC BLOOD PRESSURE: 72 MMHG | HEIGHT: 68 IN | WEIGHT: 243.81 LBS | SYSTOLIC BLOOD PRESSURE: 118 MMHG | BODY MASS INDEX: 36.95 KG/M2

## 2023-06-29 DIAGNOSIS — Z12.31 ENCOUNTER FOR SCREENING MAMMOGRAM FOR MALIGNANT NEOPLASM OF BREAST: ICD-10-CM

## 2023-06-29 DIAGNOSIS — Z80.0 FAMILY HISTORY OF COLON CANCER: ICD-10-CM

## 2023-06-29 DIAGNOSIS — Z01.419 WELL WOMAN EXAM WITH ROUTINE GYNECOLOGICAL EXAM: Primary | ICD-10-CM

## 2023-06-29 DIAGNOSIS — N92.0 MENORRHAGIA WITH REGULAR CYCLE: ICD-10-CM

## 2023-06-29 DIAGNOSIS — Z12.4 SCREENING FOR CERVICAL CANCER: ICD-10-CM

## 2023-06-29 DIAGNOSIS — Z11.51 SCREENING FOR HUMAN PAPILLOMAVIRUS (HPV): ICD-10-CM

## 2023-06-29 LAB
CONTROL LINE PRESENT WITH A CLEAR BACKGROUND (YES/NO): YES YES/NO
KIT LOT #: NORMAL NUMERIC
PREGNANCY TEST, URINE: NEGATIVE

## 2023-06-29 PROCEDURE — 99213 OFFICE O/P EST LOW 20 MIN: CPT | Performed by: NURSE PRACTITIONER

## 2023-06-29 PROCEDURE — 3008F BODY MASS INDEX DOCD: CPT | Performed by: NURSE PRACTITIONER

## 2023-06-29 PROCEDURE — 3078F DIAST BP <80 MM HG: CPT | Performed by: NURSE PRACTITIONER

## 2023-06-29 PROCEDURE — 3074F SYST BP LT 130 MM HG: CPT | Performed by: NURSE PRACTITIONER

## 2023-06-29 PROCEDURE — 77067 SCR MAMMO BI INCL CAD: CPT | Performed by: NURSE PRACTITIONER

## 2023-06-29 PROCEDURE — 99395 PREV VISIT EST AGE 18-39: CPT | Performed by: NURSE PRACTITIONER

## 2023-06-29 PROCEDURE — 81025 URINE PREGNANCY TEST: CPT | Performed by: NURSE PRACTITIONER

## 2023-06-29 PROCEDURE — 77063 BREAST TOMOSYNTHESIS BI: CPT | Performed by: NURSE PRACTITIONER

## 2023-06-29 RX ORDER — NORETHINDRONE ACETATE AND ETHINYL ESTRADIOL 1MG-20(21)
1 KIT ORAL DAILY
Qty: 84 TABLET | Refills: 3 | Status: SHIPPED | OUTPATIENT
Start: 2023-06-29

## 2023-07-03 LAB — HPV MRNA E6/E7: NOT DETECTED

## 2023-07-07 ENCOUNTER — OFFICE VISIT (OUTPATIENT)
Dept: FAMILY MEDICINE CLINIC | Facility: CLINIC | Age: 38
End: 2023-07-07
Payer: COMMERCIAL

## 2023-07-07 VITALS
BODY MASS INDEX: 37.28 KG/M2 | DIASTOLIC BLOOD PRESSURE: 74 MMHG | TEMPERATURE: 97 F | HEIGHT: 68 IN | WEIGHT: 246 LBS | SYSTOLIC BLOOD PRESSURE: 126 MMHG | RESPIRATION RATE: 16 BRPM | HEART RATE: 72 BPM

## 2023-07-07 DIAGNOSIS — F32.0 CURRENT MILD EPISODE OF MAJOR DEPRESSIVE DISORDER, UNSPECIFIED WHETHER RECURRENT (HCC): ICD-10-CM

## 2023-07-07 DIAGNOSIS — E78.00 HYPERCHOLESTEREMIA: ICD-10-CM

## 2023-07-07 DIAGNOSIS — Z80.0 FAMILY HISTORY OF COLON CANCER: ICD-10-CM

## 2023-07-07 NOTE — PATIENT INSTRUCTIONS
Schedule follow up with either [facility] LAUREN EVANS or Dr. Edward Phillips. Check with Bernalexander Vasques to see if [facility]JENNIFER SALMERON or Ozempic are covered for weight loss and if so, what is your monthly copayment? You could also check on [employer] to see if covered and what the copayment would be. Check with your aunt to see if she had genetic testing.

## 2023-07-09 LAB
ABSOLUTE BASOPHILS: 49 CELLS/UL (ref 0–200)
ABSOLUTE EOSINOPHILS: 164 CELLS/UL (ref 15–500)
ABSOLUTE LYMPHOCYTES: 2222 CELLS/UL (ref 850–3900)
ABSOLUTE MONOCYTES: 541 CELLS/UL (ref 200–950)
ABSOLUTE NEUTROPHILS: 5223 CELLS/UL (ref 1500–7800)
ALBUMIN/GLOBULIN RATIO: 1.5 (CALC) (ref 1–2.5)
ALBUMIN: 4.1 G/DL (ref 3.6–5.1)
ALKALINE PHOSPHATASE: 57 U/L (ref 31–125)
ALT: 15 U/L (ref 6–29)
AST: 13 U/L (ref 10–30)
BASOPHILS: 0.6 %
BILIRUBIN, TOTAL: 0.3 MG/DL (ref 0.2–1.2)
BUN: 13 MG/DL (ref 7–25)
CALCIUM: 9.7 MG/DL (ref 8.6–10.2)
CARBON DIOXIDE: 27 MMOL/L (ref 20–32)
CHLORIDE: 102 MMOL/L (ref 98–110)
CHOL/HDLC RATIO: 4.7 (CALC)
CHOLESTEROL, TOTAL: 253 MG/DL
CREATININE: 0.82 MG/DL (ref 0.5–0.97)
EGFR: 93 ML/MIN/1.73M2
EOSINOPHILS: 2 %
GLOBULIN: 2.8 G/DL (CALC) (ref 1.9–3.7)
GLUCOSE: 99 MG/DL (ref 65–99)
HDL CHOLESTEROL: 54 MG/DL
HEMATOCRIT: 39.6 % (ref 35–45)
HEMOGLOBIN A1C: 5.2 % OF TOTAL HGB
HEMOGLOBIN: 12.9 G/DL (ref 11.7–15.5)
LDL-CHOLESTEROL: 149 MG/DL (CALC)
LYMPHOCYTES: 27.1 %
MCH: 29.7 PG (ref 27–33)
MCHC: 32.6 G/DL (ref 32–36)
MCV: 91.2 FL (ref 80–100)
MONOCYTES: 6.6 %
MPV: 10.3 FL (ref 7.5–12.5)
NEUTROPHILS: 63.7 %
NON-HDL CHOLESTEROL: 199 MG/DL (CALC)
PLATELET COUNT: 363 THOUSAND/UL (ref 140–400)
POTASSIUM: 4.5 MMOL/L (ref 3.5–5.3)
PROTEIN, TOTAL: 6.9 G/DL (ref 6.1–8.1)
RDW: 13.7 % (ref 11–15)
RED BLOOD CELL COUNT: 4.34 MILLION/UL (ref 3.8–5.1)
SODIUM: 139 MMOL/L (ref 135–146)
TRIGLYCERIDES: 325 MG/DL
TSH: 2.92 MIU/L
WHITE BLOOD CELL COUNT: 8.2 THOUSAND/UL (ref 3.8–10.8)

## 2023-07-14 ENCOUNTER — OFFICE VISIT (OUTPATIENT)
Dept: FAMILY MEDICINE CLINIC | Facility: CLINIC | Age: 38
End: 2023-07-14
Payer: COMMERCIAL

## 2023-07-14 VITALS
BODY MASS INDEX: 37.1 KG/M2 | HEIGHT: 67.52 IN | SYSTOLIC BLOOD PRESSURE: 132 MMHG | RESPIRATION RATE: 16 BRPM | WEIGHT: 242 LBS | DIASTOLIC BLOOD PRESSURE: 78 MMHG | TEMPERATURE: 98 F | HEART RATE: 74 BPM

## 2023-07-14 DIAGNOSIS — E66.01 CLASS 2 SEVERE OBESITY DUE TO EXCESS CALORIES WITH SERIOUS COMORBIDITY AND BODY MASS INDEX (BMI) OF 37.0 TO 37.9 IN ADULT: ICD-10-CM

## 2023-07-14 DIAGNOSIS — E78.2 MIXED HYPERLIPIDEMIA: Primary | ICD-10-CM

## 2023-07-14 DIAGNOSIS — F41.1 ANXIETY STATE: ICD-10-CM

## 2023-07-14 DIAGNOSIS — F90.2 ATTENTION DEFICIT HYPERACTIVITY DISORDER (ADHD), COMBINED TYPE: ICD-10-CM

## 2023-07-14 DIAGNOSIS — F32.0 CURRENT MILD EPISODE OF MAJOR DEPRESSIVE DISORDER, UNSPECIFIED WHETHER RECURRENT (HCC): ICD-10-CM

## 2023-07-14 DIAGNOSIS — F50.81 BINGE EATING DISORDER: ICD-10-CM

## 2023-07-14 PROCEDURE — 3075F SYST BP GE 130 - 139MM HG: CPT | Performed by: STUDENT IN AN ORGANIZED HEALTH CARE EDUCATION/TRAINING PROGRAM

## 2023-07-14 PROCEDURE — 99214 OFFICE O/P EST MOD 30 MIN: CPT | Performed by: STUDENT IN AN ORGANIZED HEALTH CARE EDUCATION/TRAINING PROGRAM

## 2023-07-14 PROCEDURE — 3078F DIAST BP <80 MM HG: CPT | Performed by: STUDENT IN AN ORGANIZED HEALTH CARE EDUCATION/TRAINING PROGRAM

## 2023-07-14 PROCEDURE — 3008F BODY MASS INDEX DOCD: CPT | Performed by: STUDENT IN AN ORGANIZED HEALTH CARE EDUCATION/TRAINING PROGRAM

## 2023-07-14 RX ORDER — ROSUVASTATIN CALCIUM 5 MG/1
5 TABLET, COATED ORAL NIGHTLY
Qty: 90 TABLET | Refills: 0 | Status: SHIPPED | OUTPATIENT
Start: 2023-07-14

## 2023-07-14 RX ORDER — SERTRALINE HYDROCHLORIDE 100 MG/1
50 TABLET, FILM COATED ORAL DAILY
Qty: 90 TABLET | Refills: 0 | COMMUNITY
Start: 2023-07-14

## 2023-08-09 ENCOUNTER — HOSPITAL ENCOUNTER (OUTPATIENT)
Age: 38
Discharge: HOME | End: 2023-08-09
Payer: COMMERCIAL

## 2023-08-09 VITALS
DIASTOLIC BLOOD PRESSURE: 87 MMHG | OXYGEN SATURATION: 98 % | WEIGHT: 230 LBS | HEIGHT: 68 IN | BODY MASS INDEX: 34.86 KG/M2 | TEMPERATURE: 97 F | HEART RATE: 76 BPM | SYSTOLIC BLOOD PRESSURE: 130 MMHG | RESPIRATION RATE: 16 BRPM

## 2023-08-09 DIAGNOSIS — U07.1 COVID-19: Primary | ICD-10-CM

## 2023-08-09 DIAGNOSIS — R05.9 COUGH: ICD-10-CM

## 2023-08-09 LAB — SARS-COV-2 RNA RESP QL NAA+PROBE: DETECTED

## 2023-08-09 PROCEDURE — U0002 COVID-19 LAB TEST NON-CDC: HCPCS | Performed by: PHYSICIAN ASSISTANT

## 2023-08-09 PROCEDURE — 99213 OFFICE O/P EST LOW 20 MIN: CPT | Performed by: PHYSICIAN ASSISTANT

## 2023-08-09 RX ORDER — FLUTICASONE PROPIONATE 50 MCG
2 SPRAY, SUSPENSION (ML) NASAL DAILY
Qty: 16 G | Refills: 0 | Status: SHIPPED | OUTPATIENT
Start: 2023-08-09 | End: 2023-09-08

## 2023-08-09 NOTE — DISCHARGE INSTRUCTIONS
Flonase. Purchase Sudafed directly from the pharmacist and take as directed. Push clear fluids. Vitamin C and zinc.  For any acute chest pain shortness of breath report to the ER. Quarantine as discussed.

## 2023-08-14 ENCOUNTER — HOSPITAL ENCOUNTER (OUTPATIENT)
Dept: MAMMOGRAPHY | Age: 38
Discharge: HOME | End: 2023-08-14
Attending: NURSE PRACTITIONER
Payer: COMMERCIAL

## 2023-08-14 ENCOUNTER — HOSPITAL ENCOUNTER (OUTPATIENT)
Dept: ULTRASOUND IMAGING | Age: 38
Discharge: HOME | End: 2023-08-14
Attending: NURSE PRACTITIONER
Payer: COMMERCIAL

## 2023-08-14 DIAGNOSIS — R92.2 INCONCLUSIVE MAMMOGRAM: ICD-10-CM

## 2023-08-14 DIAGNOSIS — Z00.00 ANNUAL PHYSICAL EXAM: ICD-10-CM

## 2023-08-14 DIAGNOSIS — F41.1 ANXIETY STATE: ICD-10-CM

## 2023-08-14 DIAGNOSIS — G47.00 INSOMNIA, UNSPECIFIED TYPE: Primary | ICD-10-CM

## 2023-08-14 DIAGNOSIS — F32.0 CURRENT MILD EPISODE OF MAJOR DEPRESSIVE DISORDER, UNSPECIFIED WHETHER RECURRENT (HCC): ICD-10-CM

## 2023-08-14 PROCEDURE — 77062 BREAST TOMOSYNTHESIS BI: CPT | Performed by: NURSE PRACTITIONER

## 2023-08-14 PROCEDURE — 76642 ULTRASOUND BREAST LIMITED: CPT | Performed by: NURSE PRACTITIONER

## 2023-08-14 PROCEDURE — 77066 DX MAMMO INCL CAD BI: CPT | Performed by: NURSE PRACTITIONER

## 2023-08-15 RX ORDER — ZALEPLON 10 MG/1
10 CAPSULE ORAL NIGHTLY
Qty: 90 CAPSULE | Refills: 0 | Status: SHIPPED | OUTPATIENT
Start: 2023-08-21 | End: 2023-11-19

## 2023-09-21 DIAGNOSIS — F90.2 ATTENTION DEFICIT HYPERACTIVITY DISORDER (ADHD), COMBINED TYPE: ICD-10-CM

## 2023-09-21 DIAGNOSIS — F50.81 BINGE EATING DISORDER: ICD-10-CM

## 2023-09-21 DIAGNOSIS — E66.01 CLASS 2 SEVERE OBESITY DUE TO EXCESS CALORIES WITH SERIOUS COMORBIDITY AND BODY MASS INDEX (BMI) OF 37.0 TO 37.9 IN ADULT: ICD-10-CM

## 2023-09-21 NOTE — TELEPHONE ENCOUNTER
Last fill 8.14    Return in about 3 months (around 10/14/2023) for medication follow up or sooner if needed. Please approve if appropriate. Thanks.

## 2023-10-20 DIAGNOSIS — E78.2 MIXED HYPERLIPIDEMIA: ICD-10-CM

## 2023-10-20 DIAGNOSIS — E66.01 CLASS 2 SEVERE OBESITY DUE TO EXCESS CALORIES WITH SERIOUS COMORBIDITY AND BODY MASS INDEX (BMI) OF 37.0 TO 37.9 IN ADULT  (HCC): ICD-10-CM

## 2023-10-20 DIAGNOSIS — F90.2 ATTENTION DEFICIT HYPERACTIVITY DISORDER (ADHD), COMBINED TYPE: ICD-10-CM

## 2023-10-20 DIAGNOSIS — F50.81 BINGE EATING DISORDER: ICD-10-CM

## 2023-10-23 RX ORDER — ROSUVASTATIN CALCIUM 5 MG/1
5 TABLET, COATED ORAL NIGHTLY
Qty: 90 TABLET | Refills: 0 | Status: SHIPPED | OUTPATIENT
Start: 2023-10-23 | End: 2024-01-11

## 2023-10-23 RX ORDER — LISDEXAMFETAMINE DIMESYLATE CAPSULES 50 MG/1
50 CAPSULE ORAL EVERY MORNING
Qty: 30 CAPSULE | Refills: 0 | Status: SHIPPED | OUTPATIENT
Start: 2023-10-23 | End: 2023-11-28

## 2023-10-23 NOTE — TELEPHONE ENCOUNTER
A refill request was received for:  Requested Prescriptions     Pending Prescriptions Disp Refills    rosuvastatin 5 MG Oral Tab 90 tablet 0     Sig: Take 1 tablet (5 mg total) by mouth nightly.    lisdexamfetamine (VYVANSE) 50 MG Oral Cap 30 capsule 0     Sig: Take 1 capsule (50 mg total) by mouth every morning.       Last refill date:   rosuvastatin 07/14/23  Lidexamfetamine (vyvanse) 09/21/23      Last office visit:07/14/23      Future Appointments   Date Time Provider Department Center   12/5/2023 10:00 AM Ainsley Barajas APRN [facility]

## 2023-10-27 NOTE — TELEPHONE ENCOUNTER
Notice in covermymeds states that Vyvanse 50mg Capsules are approved 12/24/19 through 1/22/21 Case ID: [id number]  Called to [facility], spoke with tara Perdomo. Advised her of approval, she voiced understanding and agreed to plan.
Received request for a PA to be completed for pt's Vyvanse 50mg capsules. PA completed via [employer] and sent to plan.
No

## 2023-11-28 DIAGNOSIS — F50.81 BINGE EATING DISORDER: ICD-10-CM

## 2023-11-28 DIAGNOSIS — E66.01 CLASS 2 SEVERE OBESITY DUE TO EXCESS CALORIES WITH SERIOUS COMORBIDITY AND BODY MASS INDEX (BMI) OF 37.0 TO 37.9 IN ADULT (HCC): ICD-10-CM

## 2023-11-28 DIAGNOSIS — F90.2 ATTENTION DEFICIT HYPERACTIVITY DISORDER (ADHD), COMBINED TYPE: ICD-10-CM

## 2023-11-30 NOTE — TELEPHONE ENCOUNTER
Please call and schedule    Did not pass protocol  Last office visit 7/14  Last refill was:10/23, 30 caps  Next appointment: due for med follow up    Please sign pended medication if appropriate

## 2023-12-05 DIAGNOSIS — F90.2 ATTENTION DEFICIT HYPERACTIVITY DISORDER (ADHD), COMBINED TYPE: ICD-10-CM

## 2023-12-05 DIAGNOSIS — F50.81 BINGE EATING DISORDER: ICD-10-CM

## 2023-12-05 DIAGNOSIS — E66.01 CLASS 2 SEVERE OBESITY DUE TO EXCESS CALORIES WITH SERIOUS COMORBIDITY AND BODY MASS INDEX (BMI) OF 37.0 TO 37.9 IN ADULT: ICD-10-CM

## 2023-12-06 RX ORDER — LISDEXAMFETAMINE DIMESYLATE CAPSULES 50 MG/1
50 CAPSULE ORAL EVERY MORNING
Qty: 30 CAPSULE | Refills: 0 | OUTPATIENT
Start: 2023-12-06

## 2023-12-08 RX ORDER — LISDEXAMFETAMINE DIMESYLATE CAPSULES 50 MG/1
50 CAPSULE ORAL EVERY MORNING
Qty: 30 CAPSULE | Refills: 0 | Status: SHIPPED | OUTPATIENT
Start: 2023-12-08

## 2023-12-08 NOTE — TELEPHONE ENCOUNTER
LOV:7-14-23    Last Refill:      RTC:12-22-23      Pharmacy mariana       Protocol       Pt has appointment on 12-22-23   Please sign if appropriate

## 2023-12-16 NOTE — PROGRESS NOTES
Binge eating disorder    Patient is here with a known diagnosis of binge eating disorder. She she has been seeing her counselor on a regular basis. She had previously been on Vyvanse for her binge eating disorder but had stopped it.   She would like to re Imaging & Consults:  None   Patient will call with an update in 3-4 weeks  Time: 25 minutes No

## 2024-02-05 ENCOUNTER — MED REC SCAN ONLY (OUTPATIENT)
Dept: FAMILY MEDICINE CLINIC | Facility: CLINIC | Age: 39
End: 2024-02-05

## 2024-02-11 DIAGNOSIS — F50.81 BINGE EATING DISORDER: ICD-10-CM

## 2024-02-11 DIAGNOSIS — F90.2 ATTENTION DEFICIT HYPERACTIVITY DISORDER (ADHD), COMBINED TYPE: ICD-10-CM

## 2024-02-11 DIAGNOSIS — E66.01 CLASS 2 SEVERE OBESITY DUE TO EXCESS CALORIES WITH SERIOUS COMORBIDITY AND BODY MASS INDEX (BMI) OF 37.0 TO 37.9 IN ADULT  (HCC): ICD-10-CM

## 2024-02-11 RX ORDER — LISDEXAMFETAMINE DIMESYLATE CAPSULES 60 MG/1
60 CAPSULE ORAL DAILY
Qty: 30 CAPSULE | Refills: 0 | Status: CANCELLED | OUTPATIENT
Start: 2024-02-11 | End: 2024-03-12

## 2024-02-12 NOTE — TELEPHONE ENCOUNTER
Patient on 3 month panel. Should have refill 02/11/2024. Mcm sent and medication denied.      Aubrie Lee(NP)

## 2024-02-16 DIAGNOSIS — F90.2 ATTENTION DEFICIT HYPERACTIVITY DISORDER (ADHD), COMBINED TYPE: ICD-10-CM

## 2024-02-16 DIAGNOSIS — F50.81 BINGE EATING DISORDER: ICD-10-CM

## 2024-02-16 DIAGNOSIS — E66.01 CLASS 2 SEVERE OBESITY DUE TO EXCESS CALORIES WITH SERIOUS COMORBIDITY AND BODY MASS INDEX (BMI) OF 37.0 TO 37.9 IN ADULT  (HCC): ICD-10-CM

## 2024-02-16 RX ORDER — LISDEXAMFETAMINE DIMESYLATE CAPSULES 60 MG/1
60 CAPSULE ORAL DAILY
Qty: 30 CAPSULE | Refills: 0 | Status: SHIPPED | OUTPATIENT
Start: 2024-02-16 | End: 2024-03-17

## 2024-02-16 NOTE — TELEPHONE ENCOUNTER
[facility] on [address] [facility] calling re: pt's Vyvansse    They are out of this medication but advise another [facility] has in stock: [facility] on [address]    They will cancel the one we sent but are asking this to be re-sent to the other [facility] as they have in stock.    I pended if you agree    Please approve if appropriate. Thanks.

## 2024-03-18 DIAGNOSIS — F32.0 CURRENT MILD EPISODE OF MAJOR DEPRESSIVE DISORDER, UNSPECIFIED WHETHER RECURRENT (HCC): ICD-10-CM

## 2024-03-18 DIAGNOSIS — Z00.00 ANNUAL PHYSICAL EXAM: ICD-10-CM

## 2024-03-18 DIAGNOSIS — F41.1 ANXIETY STATE: ICD-10-CM

## 2024-03-19 RX ORDER — ZALEPLON 10 MG/1
10 CAPSULE ORAL NIGHTLY
Qty: 90 CAPSULE | Refills: 0 | Status: SHIPPED | OUTPATIENT
Start: 2024-03-19

## 2024-03-19 NOTE — TELEPHONE ENCOUNTER
Pt failed refill protocol for the following reasons:     Name from pharmacy: ZALEPLON 10MG CAPSULES         Will file in chart as: ZALEPLON 10 MG Oral Cap    The original prescription was discontinued on 8/15/2023 by Gilda Kong APRN. Renewing this prescription may not be appropriate.    Sig: TAKE 1 CAPSULE BY MOUTH EVERY NIGHT AT BEDTIME    Disp: 90 capsule    Refills: 0 (Pharmacy requested: Not specified)    Start: 3/18/2024    Class: Normal    Non-formulary For: Anxiety state; Annual physical exam; Current mild episode of major depressive disorder, unspecified whether recurrent (HCC)    Last ordered: 10 months ago (5/23/2023) by CORTEZ Martinez    Last refill: 5/23/2023    Rx #: [id number]    Controlled Substance Medication Ooasyv1403/18/2024 11:19 PM    This medication is a controlled substance - forward to provider to refill      To be filled at: [id number] [place], [place] - [address] AT [facility] [address], [phone], [phone]       Last refill: 12/11/23  Last appt: 3/1/23  Next appt: No future appointments.      Forward to Dr. Nieves, please advise on refills. Thank you.

## 2024-04-21 DIAGNOSIS — F90.2 ATTENTION DEFICIT HYPERACTIVITY DISORDER (ADHD), COMBINED TYPE: ICD-10-CM

## 2024-04-21 DIAGNOSIS — F50.81 BINGE EATING DISORDER: ICD-10-CM

## 2024-04-21 DIAGNOSIS — E66.01 CLASS 2 SEVERE OBESITY DUE TO EXCESS CALORIES WITH SERIOUS COMORBIDITY AND BODY MASS INDEX (BMI) OF 37.0 TO 37.9 IN ADULT (HCC): ICD-10-CM

## 2024-04-24 RX ORDER — LISDEXAMFETAMINE DIMESYLATE 50 MG/1
50 CAPSULE ORAL EVERY MORNING
Qty: 30 CAPSULE | Refills: 0 | Status: SHIPPED | OUTPATIENT
Start: 2024-04-24 | End: 2024-05-22 | Stop reason: DRUGHIGH

## 2024-05-07 DIAGNOSIS — G43.109 MIGRAINE WITH AURA AND WITHOUT STATUS MIGRAINOSUS, NOT INTRACTABLE: ICD-10-CM

## 2024-05-08 NOTE — TELEPHONE ENCOUNTER
Last office visit: 1/11/2024  Last Refill: 1/11/2024  Return To Clinic: 4/11/2024 per last office visit. Please fill if appropriate   Protocol:   Medication Quantity Refills Start End   buPROPion  MG Oral Tablet 24 Hr 90 tablet 0 1/11/2024 --   Sig:   Take 1 tablet (300 mg total) by mouth every morning.     Route:   Oral       Please call patient to schedule medication follow up then route to Dr. Nieves

## 2024-05-13 RX ORDER — BUPROPION HYDROCHLORIDE 300 MG/1
300 TABLET ORAL EVERY MORNING
Qty: 90 TABLET | Refills: 0 | Status: SHIPPED | OUTPATIENT
Start: 2024-05-13 | End: 2024-08-06

## 2024-07-01 DIAGNOSIS — F90.2 ATTENTION DEFICIT HYPERACTIVITY DISORDER (ADHD), COMBINED TYPE: ICD-10-CM

## 2024-07-01 DIAGNOSIS — F41.1 ANXIETY STATE: ICD-10-CM

## 2024-07-01 DIAGNOSIS — E66.01 CLASS 2 SEVERE OBESITY DUE TO EXCESS CALORIES WITH SERIOUS COMORBIDITY AND BODY MASS INDEX (BMI) OF 37.0 TO 37.9 IN ADULT (HCC): ICD-10-CM

## 2024-07-01 DIAGNOSIS — F50.81 BINGE EATING DISORDER: ICD-10-CM

## 2024-07-01 DIAGNOSIS — F32.0 MILD MAJOR DEPRESSION (HCC): ICD-10-CM

## 2024-07-01 DIAGNOSIS — G43.109 MIGRAINE WITH AURA AND WITHOUT STATUS MIGRAINOSUS, NOT INTRACTABLE: ICD-10-CM

## 2024-07-01 RX ORDER — SERTRALINE HYDROCHLORIDE 100 MG/1
100 TABLET, FILM COATED ORAL DAILY
Qty: 90 TABLET | Refills: 0 | Status: CANCELLED | OUTPATIENT
Start: 2024-07-01

## 2024-07-01 RX ORDER — SUMATRIPTAN 100 MG/1
TABLET, FILM COATED ORAL
Qty: 9 TABLET | Refills: 0 | Status: SHIPPED | OUTPATIENT
Start: 2024-07-01

## 2024-07-01 RX ORDER — LISDEXAMFETAMINE DIMESYLATE 60 MG/1
60 CAPSULE ORAL DAILY
Qty: 30 CAPSULE | Refills: 0 | Status: SHIPPED | OUTPATIENT
Start: 2024-07-01 | End: 2024-07-31

## 2024-07-01 NOTE — TELEPHONE ENCOUNTER
Requeting:  Lisdexamfetamine Dimesylate (VYVANSE) 60 MG Oral Cap - Has 1 left    Also asking for approximately 8 tabs of SUMAtriptan Succinate 100 MG Oral Tab.  Pt states forgot her medication at home and is out of medication.      To:  [id number] - [place], [place] - [address] AT [address], [phone], [phone] [[id number]]       Please call pt when Rx is sent, thank you.

## 2024-07-01 NOTE — TELEPHONE ENCOUNTER
LOV 05/22/20024  NOV due in 3 months.   Patient is requesting a refill for vyvanse 60 mg 1 daily   And

## 2024-07-02 DIAGNOSIS — F32.0 MILD MAJOR DEPRESSION (HCC): ICD-10-CM

## 2024-07-02 DIAGNOSIS — F41.1 ANXIETY STATE: ICD-10-CM

## 2024-07-02 RX ORDER — SERTRALINE HYDROCHLORIDE 100 MG/1
100 TABLET, FILM COATED ORAL DAILY
Qty: 5 TABLET | Refills: 0 | Status: SHIPPED | OUTPATIENT
Start: 2024-07-02

## 2024-07-02 NOTE — TELEPHONE ENCOUNTER
Last Refill:  Medication Quantity Refills Start End   sertraline 100 MG Oral Tab 90 tablet 0 5/22/2024 --   Sig:   Take 1 tablet (100 mg total) by mouth daily.       Called [facility] [place], [place] and they confirmed last dispense date was 5/22/2024.      Last office visit 5/22/2024 Medication follow up    Patient states she forgot her Sertraline medication at home so she is asking for a short refills (5 pills) to be sent to [facility] in [place] to be sent as soon as possible.     Please see pended order(s) and sign if appropriate. Thank you.

## 2024-07-02 NOTE — TELEPHONE ENCOUNTER
Rx refill  sertraline 100 MG Oral Tab     Send to   [id number] - [place], [place] - [address], [phone], [phone] [[id number]]       Patient is requesting 5 pills to be sent to pharmacy.

## 2024-07-03 NOTE — TELEPHONE ENCOUNTER
Last office visit: 5/22/2024  Last Refill:   Return To Clinic: 8/22/2024 per last office visit  Protocol:

## 2024-07-08 DIAGNOSIS — F32.0 CURRENT MILD EPISODE OF MAJOR DEPRESSIVE DISORDER, UNSPECIFIED WHETHER RECURRENT (HCC): ICD-10-CM

## 2024-07-08 DIAGNOSIS — Z00.00 ANNUAL PHYSICAL EXAM: ICD-10-CM

## 2024-07-08 DIAGNOSIS — F41.1 ANXIETY STATE: ICD-10-CM

## 2024-07-14 DIAGNOSIS — F41.1 ANXIETY STATE: ICD-10-CM

## 2024-07-14 DIAGNOSIS — Z00.00 ANNUAL PHYSICAL EXAM: ICD-10-CM

## 2024-07-14 DIAGNOSIS — F32.0 CURRENT MILD EPISODE OF MAJOR DEPRESSIVE DISORDER, UNSPECIFIED WHETHER RECURRENT (HCC): ICD-10-CM

## 2024-07-15 RX ORDER — ZALEPLON 10 MG/1
10 CAPSULE ORAL NIGHTLY
Qty: 90 CAPSULE | Refills: 0 | Status: SHIPPED | OUTPATIENT
Start: 2024-07-15

## 2024-08-06 DIAGNOSIS — G43.109 MIGRAINE WITH AURA AND WITHOUT STATUS MIGRAINOSUS, NOT INTRACTABLE: ICD-10-CM

## 2024-08-06 RX ORDER — BUPROPION HYDROCHLORIDE 300 MG/1
300 TABLET ORAL EVERY MORNING
Qty: 90 TABLET | Refills: 0 | Status: SHIPPED | OUTPATIENT
Start: 2024-08-06

## 2024-08-06 NOTE — TELEPHONE ENCOUNTER
Did not pass protocol  Last office visit  5/22/2024  Last refill was: , _5/13/2024  90_tabs  Next appointment:  none scheduled    Please sign pended medication if appropriate            Medication Quantity Refills Start End   buPROPion  MG Oral Tablet 24 Hr 90 tablet 0 5/13/2024 --   Sig:   TAKE 1 TABLET EVERY MORNING     Route:   Oral

## 2024-08-07 RX ORDER — ZALEPLON 10 MG/1
10 CAPSULE ORAL NIGHTLY
Qty: 90 CAPSULE | Refills: 0 | OUTPATIENT
Start: 2024-08-07

## 2024-09-01 ENCOUNTER — HOSPITAL ENCOUNTER (OUTPATIENT)
Age: 39
Discharge: HOME | End: 2024-09-01
Payer: COMMERCIAL

## 2024-09-01 ENCOUNTER — APPOINTMENT (OUTPATIENT)
Dept: GENERAL RADIOLOGY | Age: 39
End: 2024-09-01
Attending: NURSE PRACTITIONER
Payer: COMMERCIAL

## 2024-09-01 VITALS
SYSTOLIC BLOOD PRESSURE: 131 MMHG | RESPIRATION RATE: 18 BRPM | OXYGEN SATURATION: 95 % | HEART RATE: 84 BPM | TEMPERATURE: 98 F | DIASTOLIC BLOOD PRESSURE: 85 MMHG

## 2024-09-01 DIAGNOSIS — J18.9 PNEUMONIA OF LEFT LOWER LOBE DUE TO INFECTIOUS ORGANISM: Primary | ICD-10-CM

## 2024-09-01 DIAGNOSIS — J98.01 ACUTE BRONCHOSPASM: ICD-10-CM

## 2024-09-01 PROCEDURE — 99214 OFFICE O/P EST MOD 30 MIN: CPT | Performed by: NURSE PRACTITIONER

## 2024-09-01 PROCEDURE — 94640 AIRWAY INHALATION TREATMENT: CPT | Performed by: NURSE PRACTITIONER

## 2024-09-01 PROCEDURE — 71046 X-RAY EXAM CHEST 2 VIEWS: CPT | Performed by: NURSE PRACTITIONER

## 2024-09-01 RX ORDER — IPRATROPIUM BROMIDE AND ALBUTEROL SULFATE 2.5; .5 MG/3ML; MG/3ML
3 SOLUTION RESPIRATORY (INHALATION) ONCE
Status: COMPLETED | OUTPATIENT
Start: 2024-09-01 | End: 2024-09-01

## 2024-09-01 RX ORDER — ALBUTEROL SULFATE 90 UG/1
2 AEROSOL, METERED RESPIRATORY (INHALATION) EVERY 4 HOURS PRN
Qty: 1 EACH | Refills: 0 | Status: SHIPPED | OUTPATIENT
Start: 2024-09-01 | End: 2024-10-01

## 2024-09-01 RX ORDER — LEVOFLOXACIN 750 MG/1
750 TABLET, FILM COATED ORAL DAILY
Qty: 5 TABLET | Refills: 0 | Status: SHIPPED | OUTPATIENT
Start: 2024-09-01 | End: 2024-09-06

## 2024-09-01 RX ORDER — LISDEXAMFETAMINE DIMESYLATE 60 MG/1
60 CAPSULE ORAL
COMMUNITY
Start: 2024-08-06

## 2024-09-01 RX ORDER — PREDNISONE 20 MG/1
60 TABLET ORAL ONCE
Status: COMPLETED | OUTPATIENT
Start: 2024-09-01 | End: 2024-09-01

## 2024-09-01 NOTE — ED PROVIDER NOTES
Patient Seen in: [facility]      History     Chief Complaint   Patient presents with    Cough    Shortness Of Breath     Stated Complaint: cough    Subjective:   HPI    Patient is a 39-year-old female with no significant past medical history here for evaluation of worsening cough.  Symptoms initially started on 8/29/2024, 3 days ago and have been worsening since onset.  Per patient report, she has felt crackling in her chest with inspiration.  She feels shortness of breath on exertion.  Denies fevers, chills or bodyaches.    Has taken Delsym with minimal relief.  Cough is intermittently productive.    Denies nasal congestion or sore throat.  Since onset of symptoms, patient has had 3 negative home COVID test.    No known exposure to sick contacts.    Objective:   Past Medical History:    Depression    Post partum depression    Routine gynecological examination              Past Surgical History:   Procedure Laterality Date    Colonoscopy  09/16/09    complete                Social History     Socioeconomic History    Marital status:    Tobacco Use    Smoking status: Never    Smokeless tobacco: Never   Vaping Use    Vaping status: Never Used   Substance and Sexual Activity    Alcohol use: Yes     Comment: Socially    Drug use: Never     Comment: Started using Cannabis recently multiple times a week.     Sexual activity: Yes     Partners: Male     Birth control/protection: Condom   Other Topics Concern    Caffeine Concern Yes     Comment: 1-2 cups coffee daily    Exercise Yes    Seat Belt Yes              Review of Systems    Positive for stated Chief Complaint: Cough and Shortness Of Breath    Other systems are as noted in HPI.  Constitutional and vital signs reviewed.      All other systems reviewed and negative except as noted above.    Physical Exam     ED Triage Vitals [09/01/24 0840]   /85   Pulse 84   Resp 18   Temp 97.6 °F (36.4 °C)   Temp src Temporal   SpO2 93 %   O2 Device None  (Room air)       Current Vitals:   Vital Signs  BP: 131/85  Pulse: 84  Resp: 18  Temp: 97.6 °F (36.4 °C)  Temp src: Temporal    Oxygen Therapy  SpO2: 95 %  O2 Device: None (Room air)            Physical Exam  Vitals and nursing note reviewed.   Constitutional:       General: She is not in acute distress.     Appearance: Normal appearance. She is not ill-appearing, toxic-appearing or diaphoretic.   HENT:      Nose: No congestion.      Mouth/Throat:      Mouth: Mucous membranes are moist.   Eyes:      Conjunctiva/sclera: Conjunctivae normal.      Pupils: Pupils are equal, round, and reactive to light.   Cardiovascular:      Rate and Rhythm: Normal rate and regular rhythm.      Heart sounds: Normal heart sounds.   Pulmonary:      Effort: Pulmonary effort is normal. No tachypnea, prolonged expiration or respiratory distress.      Breath sounds: Examination of the left-lower field reveals wheezing. Wheezing present.   Neurological:      Mental Status: She is alert.             ED Course   Labs Reviewed - No data to display  XR CHEST PA + LAT CHEST (CPT=71046)    Result Date: 9/1/2024  CONCLUSION:  There are pleural parenchymal opacities within the left lung base concerning for potential pneumonia and sub pulmonic effusion.   LOCATION:  [facility]   Dictated by (CST): Regine Timmons DO on 9/01/2024 at 9:22 AM     Finalized by (CST): Regine Timmons DO on 9/01/2024 at 9:23 AM                MDM                               Medical Decision Making  Differentials include but are not limited to viral URI with cough, bronchospasm, pneumonia and less likely PE.  PERC negative.  Patient was given 60 mg of prednisone and DuoNeb treatment here with subjective improvement.  Chest x-ray reveals findings consistent with pneumonia and subpulmonic effusion.  Patient does have an allergy to amoxicillin, will start 5-day course of Levaquin.  Patient to utilize Mucinex and albuterol as needed.  Strict ER precautions.  Close outpatient follow-up  with PCP in 2 days to ensure improvement with plan of care.  Patient agrees to plan of care.  All questions answered to patient's satisfaction.    Amount and/or Complexity of Data Reviewed  Radiology: ordered and independent interpretation performed. Decision-making details documented in ED Course.        Disposition and Plan     Clinical Impression:  1. Pneumonia of left lower lobe due to infectious organism    2. Acute bronchospasm         Disposition:  Discharge  9/1/2024  9:44 am    Follow-up:  Clarisa Nieves MD  [address] [place] [id number]  [phone]    In 2 days            Medications Prescribed:  Discharge Medication List as of 9/1/2024  9:50 AM        START taking these medications    Details   albuterol 108 (90 Base) MCG/ACT Inhalation Aero Soln Inhale 2 puffs into the lungs every 4 (four) hours as needed for Wheezing., Normal, Disp-1 each, R-0      levoFLOXacin 750 MG Oral Tab Take 1 tablet (750 mg total) by mouth daily for 5 days., Normal, Disp-5 tablet, R-0

## 2024-09-01 NOTE — ED INITIAL ASSESSMENT (HPI)
Patient c/o cough since 8/28/24. Feels SOB with exertion since 8/29/24. Has taken 3 covid tests at home that were negative.

## 2024-09-01 NOTE — ED QUICK NOTES
Finished breathing treatment. States she is breathing easier. O2 Sat 98% on RA. HR-90 R- 18. Will continue to monitor.

## 2024-10-07 DIAGNOSIS — F90.2 ATTENTION DEFICIT HYPERACTIVITY DISORDER (ADHD), COMBINED TYPE: ICD-10-CM

## 2024-10-07 DIAGNOSIS — F50.819 BINGE EATING DISORDER: ICD-10-CM

## 2024-10-07 DIAGNOSIS — F41.1 ANXIETY STATE: ICD-10-CM

## 2024-10-07 DIAGNOSIS — F32.0 MILD MAJOR DEPRESSION (HCC): ICD-10-CM

## 2024-10-08 NOTE — TELEPHONE ENCOUNTER
Did not pass protocol  Last office visit 2024  Last refill was: , 2024__tabs  Next appointment: none scheduled     Please sign pended medication if appropriate                Medication Quantity Refills Start End   Lisdexamfetamine Dimesylate (VYVANSE) 60 MG Oral Cap () 30 capsule 0 2024   Sig:   Take 1 capsule (60 mg total) by mouth daily.     Route:   Oral       Please call patient to schedule medication follow up  then route to Dr. Nieves

## 2024-10-17 DIAGNOSIS — F41.1 ANXIETY STATE: ICD-10-CM

## 2024-10-17 DIAGNOSIS — F50.819 BINGE EATING DISORDER: ICD-10-CM

## 2024-10-17 DIAGNOSIS — F90.2 ATTENTION DEFICIT HYPERACTIVITY DISORDER (ADHD), COMBINED TYPE: ICD-10-CM

## 2024-10-17 DIAGNOSIS — F32.0 MILD MAJOR DEPRESSION (HCC): ICD-10-CM

## 2024-10-17 RX ORDER — LISDEXAMFETAMINE DIMESYLATE 60 MG/1
60 CAPSULE ORAL EVERY MORNING
Qty: 30 CAPSULE | Refills: 0 | Status: CANCELLED | OUTPATIENT
Start: 2024-10-17

## 2024-10-17 RX ORDER — SERTRALINE HYDROCHLORIDE 100 MG/1
100 TABLET, FILM COATED ORAL DAILY
Qty: 5 TABLET | Refills: 0 | OUTPATIENT
Start: 2024-10-17

## 2024-10-17 RX ORDER — LISDEXAMFETAMINE DIMESYLATE 60 MG/1
60 CAPSULE ORAL EVERY MORNING
Qty: 30 CAPSULE | Refills: 0 | Status: SHIPPED | OUTPATIENT
Start: 2024-10-17 | End: 2024-11-26

## 2024-10-17 RX ORDER — SERTRALINE HYDROCHLORIDE 100 MG/1
100 TABLET, FILM COATED ORAL DAILY
Qty: 90 TABLET | Refills: 0 | Status: SHIPPED | OUTPATIENT
Start: 2024-10-17

## 2024-10-17 NOTE — TELEPHONE ENCOUNTER
Requested Prescriptions     Refused Prescriptions Disp Refills    SERTRALINE 100 MG Oral Tab [Pharmacy Med Name: SERTRALINE 100MG TABLETS] 5 tablet 0     Sig: TAKE 1 TABLET(100 MG) BY MOUTH DAILY     Duplicate request.  Refilled on 10/17.

## 2024-10-21 DIAGNOSIS — Z00.00 ANNUAL PHYSICAL EXAM: ICD-10-CM

## 2024-10-21 DIAGNOSIS — F32.0 CURRENT MILD EPISODE OF MAJOR DEPRESSIVE DISORDER, UNSPECIFIED WHETHER RECURRENT (HCC): ICD-10-CM

## 2024-10-21 DIAGNOSIS — F41.1 ANXIETY STATE: ICD-10-CM

## 2024-10-21 NOTE — TELEPHONE ENCOUNTER
Last Office Visit: 05/22/2024    Last Refill:   Medication Quantity Refills Start End   Zaleplon 10 MG Oral Cap 90 capsule 0 7/15/2024 --     Return to Clinic: has appt 12/02/2024    Protocol: n/a    Refill pended. Please approve if okay. Thank you.

## 2024-10-22 RX ORDER — ZALEPLON 10 MG/1
10 CAPSULE ORAL NIGHTLY
Qty: 90 CAPSULE | Refills: 0 | Status: SHIPPED | OUTPATIENT
Start: 2024-10-22

## 2024-11-04 DIAGNOSIS — G43.109 MIGRAINE WITH AURA AND WITHOUT STATUS MIGRAINOSUS, NOT INTRACTABLE: ICD-10-CM

## 2024-11-04 RX ORDER — BUPROPION HYDROCHLORIDE 300 MG/1
300 TABLET ORAL EVERY MORNING
Qty: 30 TABLET | Refills: 0 | Status: SHIPPED | OUTPATIENT
Start: 2024-11-04

## 2024-11-04 NOTE — TELEPHONE ENCOUNTER
Last Office Visit: 05/22/2024    Last Refill:   Medication Quantity Refills Start End   buPROPion  MG Oral Tablet 24 Hr 90 tablet 0 8/6/2024 --     Return to Clinic: coleen appt 12/02/20024    Protocol: n/a    Refill pended. Please approve if okay. Thank you.

## 2024-11-07 DIAGNOSIS — G43.109 MIGRAINE WITH AURA AND WITHOUT STATUS MIGRAINOSUS, NOT INTRACTABLE: ICD-10-CM

## 2024-11-07 RX ORDER — BUPROPION HYDROCHLORIDE 300 MG/1
300 TABLET ORAL EVERY MORNING
Qty: 90 TABLET | Refills: 3 | OUTPATIENT
Start: 2024-11-07

## 2024-11-26 DIAGNOSIS — F50.819 BINGE EATING DISORDER: ICD-10-CM

## 2024-11-26 DIAGNOSIS — F41.1 ANXIETY STATE: ICD-10-CM

## 2024-11-26 DIAGNOSIS — F90.2 ATTENTION DEFICIT HYPERACTIVITY DISORDER (ADHD), COMBINED TYPE: ICD-10-CM

## 2024-11-26 NOTE — TELEPHONE ENCOUNTER
Last Office Visit: 5/22/24  Last Refill: 5/22/24  Return to Clinic: 3 months   Protocol: failed  NOV: 12/2/24  Requested Prescriptions     Pending Prescriptions Disp Refills    ALPRAZolam 0.25 MG Oral Tab 30 tablet 0     Sig: Take 1 tablet (0.25 mg total) by mouth daily as needed for Anxiety. TAKE ONE TABLET BY MOUTH ONCE DAILY AS NEEDED AS DIRECTED    Lisdexamfetamine Dimesylate 60 MG Oral Cap 30 capsule 0     Sig: Take 1 capsule (60 mg total) by mouth every morning.         Please approve if appropriate.     Thank you!

## 2024-11-27 RX ORDER — ALPRAZOLAM 0.25 MG/1
0.25 TABLET ORAL DAILY PRN
Qty: 30 TABLET | Refills: 0 | Status: SHIPPED | OUTPATIENT
Start: 2024-11-27

## 2024-11-27 RX ORDER — LISDEXAMFETAMINE DIMESYLATE 60 MG/1
60 CAPSULE ORAL EVERY MORNING
Qty: 30 CAPSULE | Refills: 0 | Status: SHIPPED | OUTPATIENT
Start: 2024-11-27

## 2024-11-27 NOTE — TELEPHONE ENCOUNTER
See below. Does she need a cmp? [facility] recommended to repeat lipid in 6 mos from [facility] so that is all we ordered. Lab can add cmp, should we order? Dr Floridalma Wick wanted her to make appt to go over labs, pt sees you mostly.  Has appt with Dr Floridalma Wick 10.13 for Dr. Esparza

## 2025-01-19 DIAGNOSIS — F41.1 ANXIETY STATE: ICD-10-CM

## 2025-01-19 DIAGNOSIS — F32.0 MILD MAJOR DEPRESSION (HCC): ICD-10-CM

## 2025-01-20 RX ORDER — SERTRALINE HYDROCHLORIDE 100 MG/1
100 TABLET, FILM COATED ORAL DAILY
Qty: 90 TABLET | Refills: 0 | Status: SHIPPED | OUTPATIENT
Start: 2025-01-20

## 2025-01-20 NOTE — TELEPHONE ENCOUNTER
Did not pass protocol    Last office visit 5/22    Last refill was:     sertraline 100 MG Oral Tab 90 tablet 0 10/17/2024 --   Sig:   Take 1 tablet (100 mg total) by mouth daily.     Route:   Oral     Order #:   [id number]       Next appointment: 2/27    Please sign pended medication if appropriate

## 2025-02-18 DIAGNOSIS — F32.0 CURRENT MILD EPISODE OF MAJOR DEPRESSIVE DISORDER, UNSPECIFIED WHETHER RECURRENT: ICD-10-CM

## 2025-02-18 DIAGNOSIS — Z00.00 ANNUAL PHYSICAL EXAM: ICD-10-CM

## 2025-02-18 DIAGNOSIS — F41.1 ANXIETY STATE: ICD-10-CM

## 2025-02-19 RX ORDER — ZALEPLON 10 MG/1
10 CAPSULE ORAL NIGHTLY
Qty: 90 CAPSULE | Refills: 0 | Status: SHIPPED | OUTPATIENT
Start: 2025-02-19

## 2025-04-26 DIAGNOSIS — F50.819 BINGE EATING DISORDER: ICD-10-CM

## 2025-04-26 DIAGNOSIS — F90.2 ATTENTION DEFICIT HYPERACTIVITY DISORDER (ADHD), COMBINED TYPE: ICD-10-CM

## 2025-04-26 RX ORDER — LISDEXAMFETAMINE DIMESYLATE 60 MG/1
60 CAPSULE ORAL EVERY MORNING
Qty: 30 CAPSULE | Refills: 0 | Status: CANCELLED | OUTPATIENT
Start: 2025-04-26

## 2025-05-21 DIAGNOSIS — Z00.00 ANNUAL PHYSICAL EXAM: ICD-10-CM

## 2025-05-21 DIAGNOSIS — F32.0 CURRENT MILD EPISODE OF MAJOR DEPRESSIVE DISORDER, UNSPECIFIED WHETHER RECURRENT: ICD-10-CM

## 2025-05-21 DIAGNOSIS — F41.1 ANXIETY STATE: ICD-10-CM

## 2025-05-22 RX ORDER — ZALEPLON 10 MG/1
10 CAPSULE ORAL NIGHTLY
Qty: 90 CAPSULE | Refills: 0 | Status: SHIPPED | OUTPATIENT
Start: 2025-05-22

## 2025-05-23 ENCOUNTER — HOSPITAL ENCOUNTER (OUTPATIENT)
Age: 40
Discharge: HOME | End: 2025-05-23
Payer: COMMERCIAL

## 2025-05-23 VITALS
RESPIRATION RATE: 18 BRPM | DIASTOLIC BLOOD PRESSURE: 75 MMHG | TEMPERATURE: 99 F | HEART RATE: 78 BPM | OXYGEN SATURATION: 96 % | SYSTOLIC BLOOD PRESSURE: 127 MMHG

## 2025-05-23 DIAGNOSIS — L08.9 SKIN INFECTION: Primary | ICD-10-CM

## 2025-05-23 RX ORDER — SULFAMETHOXAZOLE AND TRIMETHOPRIM 800; 160 MG/1; MG/1
1 TABLET ORAL 2 TIMES DAILY
Qty: 14 TABLET | Refills: 0 | Status: SHIPPED | OUTPATIENT
Start: 2025-05-23 | End: 2025-05-30

## 2025-05-23 RX ORDER — MUPIROCIN 20 MG/G
1 OINTMENT TOPICAL 3 TIMES DAILY
Qty: 1 EACH | Refills: 0 | Status: SHIPPED | OUTPATIENT
Start: 2025-05-23 | End: 2025-06-06

## 2025-05-23 NOTE — ED INITIAL ASSESSMENT (HPI)
Patient here with c/o possible spider/insect bite to the left side of her neck. States it feels warm to the touch and a little painful.

## 2025-05-23 NOTE — ED PROVIDER NOTES
Patient Seen in: [facility]        History  Chief Complaint   Patient presents with    Insect Bite     Stated Complaint: spider bite on neck    Subjective:   HPI              Patient is a pleasant 40-year-old female with no significant past medical history here for evaluation of tender area on the left side of her neck.  Patient states she is starting a garden and has been outside often.  States could have possibly started as a spider/insect bite.  Areas become more tender over the last day.    Patient is not diabetic, denies fever and denies history of MRSA    Objective:     Past Medical History:    Depression    Post partum depression    Routine gynecological examination              Past Surgical History:   Procedure Laterality Date    Colonoscopy  09/16/09    complete                No pertinent social history.            Review of Systems    Positive for stated complaint: spider bite on neck  Other systems are as noted in HPI.  Constitutional and vital signs reviewed.      All other systems reviewed and negative except as noted above.                  Physical Exam    ED Triage Vitals [05/23/25 1531]   /75   Pulse 78   Resp 18   Temp 98.5 °F (36.9 °C)   Temp src Oral   SpO2 96 %   O2 Device None (Room air)       Current Vitals:   Vital Signs  BP: 127/75  Pulse: 78  Resp: 18  Temp: 98.5 °F (36.9 °C)  Temp src: Oral    Oxygen Therapy  SpO2: 96 %  O2 Device: None (Room air)            Physical Exam  Vitals and nursing note reviewed.   Constitutional:       General: She is not in acute distress.     Appearance: She is not ill-appearing, toxic-appearing or diaphoretic.   HENT:      Mouth/Throat:      Mouth: Mucous membranes are moist.   Eyes:      Pupils: Pupils are equal, round, and reactive to light.   Cardiovascular:      Rate and Rhythm: Normal rate.      Pulses: Normal pulses.   Pulmonary:      Effort: Pulmonary effort is normal.   Skin:            Comments: Annular, 6mm scabbed lesion with  mild surrounding erythema on lateral left cervical region.  There is no palpable cervical lymphadenopathy.  No crusting or palpable fluctuance.   Neurological:      Mental Status: She is alert.             ED Course  Labs Reviewed - No data to display                       MDM             Medical Decision Making  Differentials include but are not limited to infection, cellulitis, abscess and inflammatory reaction.  Will start topical Bactroban and Bactrim (urticarial reaction with amoxicillin).  Monitoring parameters and follow-up precautions reviewed with patient who agree with plan of care.  All questions answered to both patient satisfaction        Disposition and Plan     Clinical Impression:  1. Skin infection         Disposition:  Discharge  5/23/2025  3:49 pm    Follow-up:  Clarisa Nieves MD  [address] [place] [id number]  [phone]      As needed          Medications Prescribed:  Discharge Medication List as of 5/23/2025  3:58 PM        START taking these medications    Details   sulfamethoxazole-trimethoprim -160 MG Oral Tab per tablet Take 1 tablet by mouth 2 (two) times daily for 7 days., Normal, Disp-14 tablet, R-0      mupirocin 2 % External Ointment Apply 1 Application topically 3 (three) times daily for 14 days., Normal, Disp-1 each, R-0                   Supplementary Documentation:

## 2025-06-08 DIAGNOSIS — F90.2 ATTENTION DEFICIT HYPERACTIVITY DISORDER (ADHD), COMBINED TYPE: ICD-10-CM

## 2025-06-08 DIAGNOSIS — F50.819 BINGE EATING DISORDER: ICD-10-CM

## 2025-06-10 RX ORDER — LISDEXAMFETAMINE DIMESYLATE 60 MG/1
60 CAPSULE ORAL EVERY MORNING
Qty: 30 CAPSULE | Refills: 0 | Status: SHIPPED | OUTPATIENT
Start: 2025-06-10

## 2025-06-10 NOTE — TELEPHONE ENCOUNTER
Requested Prescriptions     Pending Prescriptions Disp Refills    Lisdexamfetamine Dimesylate 60 MG Oral Cap 30 capsule 0     Sig: Take 1 capsule (60 mg total) by mouth every morning.     Last refill 11/27/24 #30  LOV 2/27/25  RTC 8/27/25  No future appointments.

## 2025-07-08 DIAGNOSIS — F90.2 ATTENTION DEFICIT HYPERACTIVITY DISORDER (ADHD), COMBINED TYPE: ICD-10-CM

## 2025-07-08 DIAGNOSIS — F50.819 BINGE EATING DISORDER: ICD-10-CM

## 2025-07-10 NOTE — TELEPHONE ENCOUNTER
LOV: 2/27/2025 for: Medication Follow-Up   Patient advised to RTC on:  6 months (around 8/27/2025)   Medication Quantity Refills Start End   Lisdexamfetamine Dimesylate 60 MG Oral Cap 30 capsule 0 6/10/2025 --   Sig:   Take 1 capsule (60 mg total) by mouth every morning.

## 2025-07-11 RX ORDER — LISDEXAMFETAMINE DIMESYLATE 60 MG/1
60 CAPSULE ORAL EVERY MORNING
Qty: 30 CAPSULE | Refills: 0 | Status: SHIPPED | OUTPATIENT
Start: 2025-07-11

## 2025-07-21 ENCOUNTER — HOSPITAL ENCOUNTER (OUTPATIENT)
Dept: MAMMOGRAPHY | Age: 40
Discharge: HOME | End: 2025-07-21
Attending: STUDENT IN AN ORGANIZED HEALTH CARE EDUCATION/TRAINING PROGRAM
Payer: COMMERCIAL

## 2025-07-21 DIAGNOSIS — Z12.31 VISIT FOR SCREENING MAMMOGRAM: ICD-10-CM

## 2025-07-21 PROCEDURE — 77067 SCR MAMMO BI INCL CAD: CPT | Performed by: STUDENT IN AN ORGANIZED HEALTH CARE EDUCATION/TRAINING PROGRAM

## 2025-07-21 PROCEDURE — 77063 BREAST TOMOSYNTHESIS BI: CPT | Performed by: STUDENT IN AN ORGANIZED HEALTH CARE EDUCATION/TRAINING PROGRAM

## (undated) DIAGNOSIS — F50.81 BINGE EATING DISORDER: ICD-10-CM

## (undated) DIAGNOSIS — F41.1 ANXIETY STATE: ICD-10-CM

## (undated) DIAGNOSIS — G43.109 MIGRAINE WITH AURA AND WITHOUT STATUS MIGRAINOSUS, NOT INTRACTABLE: ICD-10-CM

## (undated) DIAGNOSIS — F32.A DEPRESSION, UNSPECIFIED DEPRESSION TYPE: ICD-10-CM

## (undated) NOTE — Clinical Note
Long term patient who left to see a PCP closer to her home but came back today to re-establish. She would like to try a weight loss medication. Labs were ordered today. She has an appointment to establish with you since I don't prescribe stimulants for ADD. I ordered labs on her today to do prior to her appointment with you.

## (undated) NOTE — LETTER
Date & Time: 8/9/2023, 8:33 AM  Patient: Larry Parsons  Encounter Provider(s):    Lisa Bullard PA-C       To Whom It May Concern:    Cinthya Sherman was seen and treated in our department on 8/9/2023. COVID-19 precautions; minimum 5-day quarantine from day of onset. If minimally symptomatic, 5 further days of mask usage.   If moderately symptomatic, 5 further days of strict quarantine    If you have any questions or concerns, please do not hesitate to call.        _____________________________  Physician/AP[facility] Signature